# Patient Record
Sex: FEMALE | Race: WHITE | NOT HISPANIC OR LATINO
[De-identification: names, ages, dates, MRNs, and addresses within clinical notes are randomized per-mention and may not be internally consistent; named-entity substitution may affect disease eponyms.]

---

## 2017-03-24 PROBLEM — Z00.00 ENCOUNTER FOR PREVENTIVE HEALTH EXAMINATION: Noted: 2017-03-24

## 2017-04-05 ENCOUNTER — APPOINTMENT (OUTPATIENT)
Dept: ULTRASOUND IMAGING | Facility: CLINIC | Age: 67
End: 2017-04-05

## 2017-05-05 ENCOUNTER — APPOINTMENT (OUTPATIENT)
Dept: GYNECOLOGIC ONCOLOGY | Facility: CLINIC | Age: 67
End: 2017-05-05

## 2017-05-05 VITALS
WEIGHT: 247 LBS | DIASTOLIC BLOOD PRESSURE: 75 MMHG | HEART RATE: 80 BPM | HEIGHT: 68 IN | BODY MASS INDEX: 37.44 KG/M2 | SYSTOLIC BLOOD PRESSURE: 120 MMHG

## 2017-05-05 DIAGNOSIS — F41.0 PANIC DISORDER [EPISODIC PAROXYSMAL ANXIETY]: ICD-10-CM

## 2017-05-05 DIAGNOSIS — E66.01 MORBID (SEVERE) OBESITY DUE TO EXCESS CALORIES: ICD-10-CM

## 2017-05-05 DIAGNOSIS — Z87.898 PERSONAL HISTORY OF OTHER SPECIFIED CONDITIONS: ICD-10-CM

## 2017-05-05 DIAGNOSIS — Z60.2 PROBLEMS RELATED TO LIVING ALONE: ICD-10-CM

## 2017-05-05 DIAGNOSIS — N95.0 POSTMENOPAUSAL BLEEDING: ICD-10-CM

## 2017-05-05 DIAGNOSIS — F17.200 NICOTINE DEPENDENCE, UNSPECIFIED, UNCOMPLICATED: ICD-10-CM
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
## 2017-05-05 DIAGNOSIS — N94.89 OTHER SPECIFIED CONDITIONS ASSOCIATED WITH FEMALE GENITAL ORGANS AND MENSTRUAL CYCLE: ICD-10-CM

## 2017-05-05 DIAGNOSIS — Z86.39 PERSONAL HISTORY OF OTHER ENDOCRINE, NUTRITIONAL AND METABOLIC DISEASE: ICD-10-CM

## 2017-05-05 DIAGNOSIS — Z86.79 PERSONAL HISTORY OF OTHER DISEASES OF THE CIRCULATORY SYSTEM: ICD-10-CM

## 2017-05-05 DIAGNOSIS — I61.9 NONTRAUMATIC INTRACEREBRAL HEMORRHAGE, UNSPECIFIED: ICD-10-CM

## 2017-05-05 DIAGNOSIS — Z87.39 PERSONAL HISTORY OF OTHER DISEASES OF THE MUSCULOSKELETAL SYSTEM AND CONNECTIVE TISSUE: ICD-10-CM

## 2017-05-05 DIAGNOSIS — M54.2 CERVICALGIA: ICD-10-CM

## 2017-05-05 DIAGNOSIS — Z86.69 PERSONAL HISTORY OF OTHER DISEASES OF THE NERVOUS SYSTEM AND SENSE ORGANS: ICD-10-CM

## 2017-05-05 RX ORDER — MULTIVITAMIN
TABLET ORAL
Refills: 0 | Status: ACTIVE | COMMUNITY

## 2017-05-05 RX ORDER — HYDROXYZINE HYDROCHLORIDE 50 MG/1
TABLET ORAL
Refills: 0 | Status: ACTIVE | COMMUNITY

## 2017-05-05 RX ORDER — LOVASTATIN 40 MG/1
TABLET ORAL
Refills: 0 | Status: ACTIVE | COMMUNITY

## 2017-05-05 RX ORDER — MULTIVIT-MIN/IRON/FOLIC ACID/K 18-600-40
CAPSULE ORAL
Refills: 0 | Status: ACTIVE | COMMUNITY

## 2017-05-05 RX ORDER — B-COMPLEX WITH VITAMIN C
TABLET ORAL
Refills: 0 | Status: ACTIVE | COMMUNITY

## 2017-05-05 RX ORDER — LACTOBACILLUS ACIDOPHILUS/PECT 30 MG-20MG
TABLET ORAL
Refills: 0 | Status: ACTIVE | COMMUNITY

## 2017-05-05 RX ORDER — MELATONIN 3 MG
TABLET ORAL
Refills: 0 | Status: ACTIVE | COMMUNITY

## 2017-05-05 RX ORDER — BACILLUS COAGULANS/INULIN 1B-250 MG
CAPSULE ORAL
Refills: 0 | Status: ACTIVE | COMMUNITY

## 2017-05-05 RX ORDER — ARMODAFINIL 200 MG/1
TABLET ORAL
Refills: 0 | Status: ACTIVE | COMMUNITY

## 2017-05-05 SDOH — SOCIAL STABILITY - SOCIAL INSECURITY: PROBLEMS RELATED TO LIVING ALONE: Z60.2

## 2017-05-17 ENCOUNTER — OUTPATIENT (OUTPATIENT)
Dept: OUTPATIENT SERVICES | Facility: HOSPITAL | Age: 67
LOS: 1 days | End: 2017-05-17
Payer: MEDICARE

## 2017-05-17 ENCOUNTER — APPOINTMENT (OUTPATIENT)
Dept: CT IMAGING | Facility: IMAGING CENTER | Age: 67
End: 2017-05-17

## 2017-05-17 DIAGNOSIS — N95.0 POSTMENOPAUSAL BLEEDING: ICD-10-CM

## 2017-05-17 PROCEDURE — 71260 CT THORAX DX C+: CPT

## 2017-05-17 PROCEDURE — 74177 CT ABD & PELVIS W/CONTRAST: CPT

## 2017-05-17 PROCEDURE — 82565 ASSAY OF CREATININE: CPT

## 2017-05-18 ENCOUNTER — RESULT REVIEW (OUTPATIENT)
Age: 67
End: 2017-05-18

## 2017-06-01 LAB — CORE LAB BIOPSY: NORMAL

## 2017-06-02 ENCOUNTER — APPOINTMENT (OUTPATIENT)
Dept: GYNECOLOGIC ONCOLOGY | Facility: CLINIC | Age: 67
End: 2017-06-02

## 2017-06-02 VITALS
WEIGHT: 247 LBS | BODY MASS INDEX: 37.44 KG/M2 | HEART RATE: 91 BPM | SYSTOLIC BLOOD PRESSURE: 157 MMHG | DIASTOLIC BLOOD PRESSURE: 81 MMHG | HEIGHT: 68 IN

## 2018-08-15 ENCOUNTER — APPOINTMENT (OUTPATIENT)
Dept: GERIATRICS | Facility: CLINIC | Age: 68
End: 2018-08-15
Payer: MEDICARE

## 2018-08-15 VITALS
OXYGEN SATURATION: 98 % | TEMPERATURE: 98.2 F | SYSTOLIC BLOOD PRESSURE: 114 MMHG | HEART RATE: 83 BPM | DIASTOLIC BLOOD PRESSURE: 62 MMHG

## 2018-08-15 DIAGNOSIS — Z71.89 OTHER SPECIFIED COUNSELING: ICD-10-CM

## 2018-08-15 PROCEDURE — 99205 OFFICE O/P NEW HI 60 MIN: CPT

## 2018-08-15 PROCEDURE — 99354: CPT

## 2018-08-15 RX ORDER — LOSARTAN POTASSIUM 50 MG/1
50 TABLET, FILM COATED ORAL
Qty: 30 | Refills: 0 | Status: ACTIVE | COMMUNITY
Start: 2018-04-06

## 2018-08-15 RX ORDER — HYDROCHLOROTHIAZIDE 25 MG/1
25 TABLET ORAL
Qty: 30 | Refills: 0 | Status: ACTIVE | COMMUNITY
Start: 2018-03-07

## 2018-08-15 RX ORDER — CLOTRIMAZOLE 10 MG/G
1 CREAM TOPICAL
Qty: 60 | Refills: 0 | Status: ACTIVE | COMMUNITY
Start: 2018-05-24

## 2018-08-15 RX ORDER — HYDROCHLOROTHIAZIDE 25 MG/1
25 TABLET ORAL
Refills: 0 | Status: ACTIVE | COMMUNITY
Start: 2018-08-15

## 2018-08-15 RX ORDER — CLOTRIMAZOLE AND BETAMETHASONE DIPROPIONATE 10; .5 MG/G; MG/G
1-0.05 CREAM TOPICAL
Qty: 15 | Refills: 0 | Status: ACTIVE | COMMUNITY
Start: 2018-05-19

## 2018-08-15 RX ORDER — MUPIROCIN 20 MG/G
2 OINTMENT TOPICAL
Qty: 22 | Refills: 0 | Status: ACTIVE | COMMUNITY
Start: 2018-04-17

## 2018-08-15 RX ORDER — MONTELUKAST 10 MG/1
10 TABLET, FILM COATED ORAL
Qty: 30 | Refills: 0 | Status: ACTIVE | COMMUNITY
Start: 2018-02-21

## 2018-08-15 RX ORDER — GABAPENTIN 300 MG/1
300 CAPSULE ORAL
Qty: 180 | Refills: 0 | Status: ACTIVE | COMMUNITY
Start: 2018-03-07

## 2018-08-15 RX ORDER — DOXEPIN HYDROCHLORIDE 25 MG/1
25 CAPSULE ORAL
Qty: 30 | Refills: 0 | Status: ACTIVE | COMMUNITY
Start: 2018-03-07

## 2018-09-12 ENCOUNTER — APPOINTMENT (OUTPATIENT)
Dept: GERIATRICS | Facility: CLINIC | Age: 68
End: 2018-09-12
Payer: MEDICARE

## 2018-09-12 VITALS
OXYGEN SATURATION: 98 % | SYSTOLIC BLOOD PRESSURE: 120 MMHG | HEART RATE: 82 BPM | DIASTOLIC BLOOD PRESSURE: 56 MMHG | TEMPERATURE: 98.6 F

## 2018-09-12 PROCEDURE — 99215 OFFICE O/P EST HI 40 MIN: CPT

## 2018-10-10 ENCOUNTER — APPOINTMENT (OUTPATIENT)
Dept: GERIATRICS | Facility: CLINIC | Age: 68
End: 2018-10-10
Payer: MEDICARE

## 2018-10-10 VITALS
DIASTOLIC BLOOD PRESSURE: 56 MMHG | HEART RATE: 92 BPM | TEMPERATURE: 98.5 F | OXYGEN SATURATION: 98 % | SYSTOLIC BLOOD PRESSURE: 112 MMHG

## 2018-10-10 PROCEDURE — 99215 OFFICE O/P EST HI 40 MIN: CPT

## 2018-10-18 ENCOUNTER — APPOINTMENT (OUTPATIENT)
Dept: GERIATRICS | Facility: CLINIC | Age: 68
End: 2018-10-18

## 2018-11-13 ENCOUNTER — MESSAGE (OUTPATIENT)
Age: 68
End: 2018-11-13

## 2018-11-29 ENCOUNTER — APPOINTMENT (OUTPATIENT)
Dept: GERIATRICS | Facility: CLINIC | Age: 68
End: 2018-11-29
Payer: MEDICARE

## 2018-11-29 VITALS
DIASTOLIC BLOOD PRESSURE: 70 MMHG | TEMPERATURE: 98.6 F | HEART RATE: 70 BPM | SYSTOLIC BLOOD PRESSURE: 110 MMHG | OXYGEN SATURATION: 98 % | HEIGHT: 68 IN | RESPIRATION RATE: 15 BRPM

## 2018-11-29 DIAGNOSIS — F32.9 MAJOR DEPRESSIVE DISORDER, SINGLE EPISODE, UNSPECIFIED: ICD-10-CM

## 2018-11-29 DIAGNOSIS — Z51.5 ENCOUNTER FOR PALLIATIVE CARE: ICD-10-CM

## 2018-11-29 PROCEDURE — 99215 OFFICE O/P EST HI 40 MIN: CPT

## 2018-12-11 PROBLEM — F32.9 DEPRESSED MOOD: Status: ACTIVE | Noted: 2018-10-10

## 2018-12-11 PROBLEM — Z51.5 ENCOUNTER FOR PALLIATIVE CARE: Status: ACTIVE | Noted: 2018-08-15

## 2019-01-07 ENCOUNTER — APPOINTMENT (OUTPATIENT)
Dept: GERIATRICS | Facility: CLINIC | Age: 69
End: 2019-01-07
Payer: MEDICARE

## 2019-01-07 VITALS
RESPIRATION RATE: 16 BRPM | SYSTOLIC BLOOD PRESSURE: 132 MMHG | DIASTOLIC BLOOD PRESSURE: 68 MMHG | HEART RATE: 62 BPM | TEMPERATURE: 98.2 F | OXYGEN SATURATION: 100 %

## 2019-01-07 DIAGNOSIS — G89.3 NEOPLASM RELATED PAIN (ACUTE) (CHRONIC): ICD-10-CM

## 2019-01-07 DIAGNOSIS — F41.9 ANXIETY DISORDER, UNSPECIFIED: ICD-10-CM

## 2019-01-07 DIAGNOSIS — K59.03 DRUG INDUCED CONSTIPATION: ICD-10-CM

## 2019-01-07 DIAGNOSIS — T40.2X5A DRUG INDUCED CONSTIPATION: ICD-10-CM

## 2019-01-07 DIAGNOSIS — R11.0 NAUSEA: ICD-10-CM

## 2019-01-07 DIAGNOSIS — C53.0 MALIGNANT NEOPLASM OF ENDOCERVIX: ICD-10-CM

## 2019-01-07 PROCEDURE — 99215 OFFICE O/P EST HI 40 MIN: CPT

## 2019-01-07 PROCEDURE — 99497 ADVNCD CARE PLAN 30 MIN: CPT

## 2019-01-07 RX ORDER — METOCLOPRAMIDE 5 MG/1
5 TABLET ORAL 3 TIMES DAILY
Qty: 60 | Refills: 0 | Status: ACTIVE | COMMUNITY
Start: 2019-01-07 | End: 1900-01-01

## 2019-01-07 RX ORDER — MORPHINE SULFATE 15 MG/1
15 TABLET, FILM COATED, EXTENDED RELEASE ORAL
Qty: 60 | Refills: 0 | Status: ACTIVE | COMMUNITY
Start: 2019-01-07 | End: 1900-01-01

## 2019-01-07 RX ORDER — LACTULOSE 10 G/15ML
10 SOLUTION ORAL EVERY 6 HOURS
Qty: 1 | Refills: 2 | Status: ACTIVE | COMMUNITY
Start: 2019-01-07 | End: 1900-01-01

## 2019-01-07 RX ORDER — CLONAZEPAM 2 MG/1
2 TABLET ORAL
Qty: 60 | Refills: 0 | Status: ACTIVE | COMMUNITY
Start: 2018-08-15 | End: 1900-01-01

## 2019-01-08 RX ORDER — CLONAZEPAM 0.25 MG/1
0.25 TABLET, ORALLY DISINTEGRATING ORAL TWICE DAILY
Qty: 30 | Refills: 0 | Status: DISCONTINUED | COMMUNITY
Start: 2017-05-05 | End: 2019-01-08

## 2019-01-08 RX ORDER — CLONAZEPAM 2 MG/1
2 TABLET ORAL
Qty: 90 | Refills: 0 | Status: DISCONTINUED | COMMUNITY
Start: 2018-05-21 | End: 2019-01-08

## 2019-01-08 RX ORDER — METHADONE HYDROCHLORIDE 5 MG/1
5 TABLET ORAL TWICE DAILY
Qty: 30 | Refills: 0 | Status: DISCONTINUED | COMMUNITY
Start: 2018-12-19 | End: 2019-01-08

## 2019-01-08 RX ORDER — BETAMETHASONE DIPROPIONATE 0.5 MG/G
0.05 CREAM TOPICAL
Qty: 45 | Refills: 0 | Status: DISCONTINUED | COMMUNITY
Start: 2018-05-24 | End: 2019-01-08

## 2019-01-15 RX ORDER — HYDROCODONE BITARTRATE AND ACETAMINOPHEN 10; 325 MG/1; MG/1
10-325 TABLET ORAL
Qty: 120 | Refills: 0 | Status: DISCONTINUED | COMMUNITY
Start: 2018-09-12 | End: 2019-01-15

## 2019-01-15 NOTE — DATA REVIEWED
[FreeTextEntry1] : MRI Pelvis (11/5/2018) - Impression: Further interval increase in size of the cervical mass with increased extension into the uterine myometrium. The previously noted parametrial invasion is no longer clearly appreciated.\par \par Interval increase in size of the periuterine metastatic depots versus lymph nodes. Tumor deposit in the rectouterine pouch is now inseparable from the anterior rectal wall which could be seen in the setting of T4 disease. \par \par New right external iliac lymphadenopathy and slightly decreased in size left external iliac adenopathy.
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
## 2019-01-15 NOTE — PHYSICAL EXAM
[General Appearance - Alert] : alert [General Appearance - In No Acute Distress] : in no acute distress [Sclera] : the sclera and conjunctiva were normal [Normal Oral Mucosa] : normal oral mucosa [Neck Appearance] : the appearance of the neck was normal [] : no respiratory distress [Auscultation Breath Sounds / Voice Sounds] : lungs were clear to auscultation bilaterally [Heart Rate And Rhythm] : heart rate was normal and rhythm regular [Heart Sounds] : normal S1 and S2 [Bowel Sounds] : normal bowel sounds [Abdomen Soft] : soft [Abnormal Walk] : normal gait [Skin Color & Pigmentation] : normal skin color and pigmentation [No Focal Deficits] : no focal deficits [Oriented To Time, Place, And Person] : oriented to person, place, and time [Affect] : the affect was normal [FreeTextEntry1] : mild TTP lower abdomen

## 2019-01-15 NOTE — ASSESSMENT
[Completed Healthcare Proxy] : completed healthcare proxy [______] : HCP: [unfilled] [FreeTextEntry1] : 68yoF with:\par \par 1. Endocervical adenocarcinoma - Disease is progressing as per recent MRI.   She wishes to pursue alternative treatments and hopes to control/shrink the tumor this way, although she is realizing this is becoming less likely.  She is not at all interested in pursuing chemotherapy.  \par \par 2. Neoplasm-related pain -  Start MS ER 15mg BID. C/w PRN Hydrocodone/Acetaminophen with diligent bowel regimen.\par C/w Gabapentin 300mg BID\par C/w vaporized medical cannabis PRN\par Cautioned on Ibuprofen and bleeding risk. \par \par 3. Anxiety - C/w PRN Clonazepam. Reassurance provided. \par \par 4. Constipation - C/w daily Lactulose, Senna, flax seed oil.  Glycerin suppository PRN. \par \par 5. Nausea - PRN Reglan prescribed. \par \par 6. Depressed mood - Pt not interested in starting SSRI at this time. She follows with therapist at [facility]. Empathetic listening provided. \par \par 7. Encounter for Palliative Care/Advance Care Planning - Health Care Proxy form completed previously assigning Paige Cuadrami ([phone]) as primary proxy and Candice Kramer ([phone]) as alternate proxy. Extensive discussion previously held about patient's wishes for various life-sustaining measures and medical interventions.  Patient expresses that she would never be agreeable to getting a colostomy or nephrostomy should her tumor cause obstructions. If it came to that point she would want to be given medications to keep her free from pain and would accept death as a consequence. \par \par MOLST form completed in office today delineating patient's preferences for DNR, DNI, no feeding tube, no hospitalizations, no IV fluids. \par \par Emotional support provided.\par \par RTO 1 month, sooner if needed

## 2019-01-18 ENCOUNTER — INPATIENT (INPATIENT)
Facility: HOSPITAL | Age: 69
LOS: 12 days | Discharge: ANOTHER HOSPITAL | End: 2019-01-31
Attending: HOSPITALIST | Admitting: HOSPITALIST
Payer: MEDICARE

## 2019-01-18 VITALS
HEART RATE: 975 BPM | OXYGEN SATURATION: 100 % | SYSTOLIC BLOOD PRESSURE: 136 MMHG | RESPIRATION RATE: 18 BRPM | TEMPERATURE: 98 F | DIASTOLIC BLOOD PRESSURE: 75 MMHG

## 2019-01-18 DIAGNOSIS — Z51.5 ENCOUNTER FOR PALLIATIVE CARE: ICD-10-CM

## 2019-01-18 DIAGNOSIS — G89.3 NEOPLASM RELATED PAIN (ACUTE) (CHRONIC): ICD-10-CM

## 2019-01-18 DIAGNOSIS — C53.0 MALIGNANT NEOPLASM OF ENDOCERVIX: ICD-10-CM

## 2019-01-18 DIAGNOSIS — G89.29 OTHER CHRONIC PAIN: ICD-10-CM

## 2019-01-18 DIAGNOSIS — F41.9 ANXIETY DISORDER, UNSPECIFIED: ICD-10-CM

## 2019-01-18 DIAGNOSIS — Z71.89 OTHER SPECIFIED COUNSELING: ICD-10-CM

## 2019-01-18 LAB
ALBUMIN SERPL ELPH-MCNC: 4 G/DL — SIGNIFICANT CHANGE UP (ref 3.3–5)
ALP SERPL-CCNC: 64 U/L — SIGNIFICANT CHANGE UP (ref 40–120)
ALT FLD-CCNC: 10 U/L — SIGNIFICANT CHANGE UP (ref 4–33)
ANION GAP SERPL CALC-SCNC: 14 MMO/L — SIGNIFICANT CHANGE UP (ref 7–14)
AST SERPL-CCNC: 13 U/L — SIGNIFICANT CHANGE UP (ref 4–32)
BASOPHILS # BLD AUTO: 0.04 K/UL — SIGNIFICANT CHANGE UP (ref 0–0.2)
BASOPHILS NFR BLD AUTO: 0.5 % — SIGNIFICANT CHANGE UP (ref 0–2)
BILIRUB SERPL-MCNC: 0.3 MG/DL — SIGNIFICANT CHANGE UP (ref 0.2–1.2)
BUN SERPL-MCNC: 6 MG/DL — LOW (ref 7–23)
CALCIUM SERPL-MCNC: 9.7 MG/DL — SIGNIFICANT CHANGE UP (ref 8.4–10.5)
CHLORIDE SERPL-SCNC: 95 MMOL/L — LOW (ref 98–107)
CO2 SERPL-SCNC: 26 MMOL/L — SIGNIFICANT CHANGE UP (ref 22–31)
CREAT SERPL-MCNC: 0.62 MG/DL — SIGNIFICANT CHANGE UP (ref 0.5–1.3)
EOSINOPHIL # BLD AUTO: 0.05 K/UL — SIGNIFICANT CHANGE UP (ref 0–0.5)
EOSINOPHIL NFR BLD AUTO: 0.6 % — SIGNIFICANT CHANGE UP (ref 0–6)
GLUCOSE SERPL-MCNC: 107 MG/DL — HIGH (ref 70–99)
HCT VFR BLD CALC: 32.9 % — LOW (ref 34.5–45)
HGB BLD-MCNC: 9.9 G/DL — LOW (ref 11.5–15.5)
IMM GRANULOCYTES NFR BLD AUTO: 0.3 % — SIGNIFICANT CHANGE UP (ref 0–1.5)
LYMPHOCYTES # BLD AUTO: 1.33 K/UL — SIGNIFICANT CHANGE UP (ref 1–3.3)
LYMPHOCYTES # BLD AUTO: 15.1 % — SIGNIFICANT CHANGE UP (ref 13–44)
MCHC RBC-ENTMCNC: 25 PG — LOW (ref 27–34)
MCHC RBC-ENTMCNC: 30.1 % — LOW (ref 32–36)
MCV RBC AUTO: 83.1 FL — SIGNIFICANT CHANGE UP (ref 80–100)
MONOCYTES # BLD AUTO: 0.39 K/UL — SIGNIFICANT CHANGE UP (ref 0–0.9)
MONOCYTES NFR BLD AUTO: 4.4 % — SIGNIFICANT CHANGE UP (ref 2–14)
NEUTROPHILS # BLD AUTO: 6.97 K/UL — SIGNIFICANT CHANGE UP (ref 1.8–7.4)
NEUTROPHILS NFR BLD AUTO: 79.1 % — HIGH (ref 43–77)
NRBC # FLD: 0 K/UL — LOW (ref 25–125)
PLATELET # BLD AUTO: 403 K/UL — HIGH (ref 150–400)
PMV BLD: 8.5 FL — SIGNIFICANT CHANGE UP (ref 7–13)
POTASSIUM SERPL-MCNC: 3.8 MMOL/L — SIGNIFICANT CHANGE UP (ref 3.5–5.3)
POTASSIUM SERPL-SCNC: 3.8 MMOL/L — SIGNIFICANT CHANGE UP (ref 3.5–5.3)
PROT SERPL-MCNC: 7.1 G/DL — SIGNIFICANT CHANGE UP (ref 6–8.3)
RBC # BLD: 3.96 M/UL — SIGNIFICANT CHANGE UP (ref 3.8–5.2)
RBC # FLD: 14.7 % — HIGH (ref 10.3–14.5)
SODIUM SERPL-SCNC: 135 MMOL/L — SIGNIFICANT CHANGE UP (ref 135–145)
WBC # BLD: 8.81 K/UL — SIGNIFICANT CHANGE UP (ref 3.8–10.5)
WBC # FLD AUTO: 8.81 K/UL — SIGNIFICANT CHANGE UP (ref 3.8–10.5)

## 2019-01-18 PROCEDURE — 99223 1ST HOSP IP/OBS HIGH 75: CPT

## 2019-01-18 PROCEDURE — 99223 1ST HOSP IP/OBS HIGH 75: CPT | Mod: GC

## 2019-01-18 RX ORDER — HYDROMORPHONE HYDROCHLORIDE 2 MG/ML
1 INJECTION INTRAMUSCULAR; INTRAVENOUS; SUBCUTANEOUS
Qty: 0 | Refills: 0 | COMMUNITY

## 2019-01-18 RX ORDER — MORPHINE SULFATE 50 MG/1
30 CAPSULE, EXTENDED RELEASE ORAL
Qty: 0 | Refills: 0 | Status: DISCONTINUED | OUTPATIENT
Start: 2019-01-18 | End: 2019-01-19

## 2019-01-18 RX ORDER — HYDROMORPHONE HYDROCHLORIDE 2 MG/ML
1 INJECTION INTRAMUSCULAR; INTRAVENOUS; SUBCUTANEOUS EVERY 4 HOURS
Qty: 0 | Refills: 0 | Status: DISCONTINUED | OUTPATIENT
Start: 2019-01-18 | End: 2019-01-19

## 2019-01-18 RX ORDER — GABAPENTIN 400 MG/1
300 CAPSULE ORAL
Qty: 0 | Refills: 0 | Status: DISCONTINUED | OUTPATIENT
Start: 2019-01-18 | End: 2019-01-22

## 2019-01-18 RX ORDER — CLONAZEPAM 1 MG
1 TABLET ORAL
Qty: 0 | Refills: 0 | COMMUNITY

## 2019-01-18 RX ORDER — SENNA PLUS 8.6 MG/1
2 TABLET ORAL AT BEDTIME
Qty: 0 | Refills: 0 | Status: DISCONTINUED | OUTPATIENT
Start: 2019-01-18 | End: 2019-01-22

## 2019-01-18 RX ORDER — SENNA PLUS 8.6 MG/1
2 TABLET ORAL
Qty: 0 | Refills: 0 | COMMUNITY

## 2019-01-18 RX ORDER — BENZOYL PEROXIDE MICRONIZED 5.8 %
1 TOWELETTE (EA) TOPICAL
Qty: 0 | Refills: 0 | COMMUNITY

## 2019-01-18 RX ORDER — CLONAZEPAM 1 MG
2 TABLET ORAL
Qty: 0 | Refills: 0 | Status: DISCONTINUED | OUTPATIENT
Start: 2019-01-18 | End: 2019-01-25

## 2019-01-18 RX ADMIN — MORPHINE SULFATE 30 MILLIGRAM(S): 50 CAPSULE, EXTENDED RELEASE ORAL at 17:49

## 2019-01-18 RX ADMIN — HYDROMORPHONE HYDROCHLORIDE 1 MILLIGRAM(S): 2 INJECTION INTRAMUSCULAR; INTRAVENOUS; SUBCUTANEOUS at 23:10

## 2019-01-18 RX ADMIN — HYDROMORPHONE HYDROCHLORIDE 1 MILLIGRAM(S): 2 INJECTION INTRAMUSCULAR; INTRAVENOUS; SUBCUTANEOUS at 23:54

## 2019-01-18 RX ADMIN — HYDROMORPHONE HYDROCHLORIDE 1 MILLIGRAM(S): 2 INJECTION INTRAMUSCULAR; INTRAVENOUS; SUBCUTANEOUS at 20:05

## 2019-01-18 RX ADMIN — Medication 2 MILLIGRAM(S): at 23:11

## 2019-01-18 RX ADMIN — HYDROMORPHONE HYDROCHLORIDE 1 MILLIGRAM(S): 2 INJECTION INTRAMUSCULAR; INTRAVENOUS; SUBCUTANEOUS at 17:49

## 2019-01-18 RX ADMIN — MORPHINE SULFATE 30 MILLIGRAM(S): 50 CAPSULE, EXTENDED RELEASE ORAL at 20:05

## 2019-01-18 NOTE — CONSULT NOTE ADULT - PROBLEM SELECTOR RECOMMENDATION 9
Patient previously on Methadone without success.  Currently on MS Contin 30mg PO BID (increased on Tuesday) and Dilaudid 4mg PO q4h PRN - Patient also taking Hydrocodone 10-20mg (unclear how many times a day) and Motrin PRN.  Patient reports pain has been better since being in the hospital - and that she has not taking any PRN medications since early this morning.  She states she had stopped the MS Contin due to a misunderstanding - but took a dose this morning - unclear as patient became frustrated with questioning what medications and amounts she was taking at home.  As per Dr. Levine - patient has not been an opioid seeker and has never requested to be hospitalized.  It seems there is a large psychosocial component given her progression of disease and pain.  Due to the unclear nature of what the patient was consistently taking at home, would recommend continuing MS Contin 30mg PO BID (assuming her renal function is normal) and starting Dilaudid 1mg IV q3h PRN - increase dose as needed.  Patient utilizing lactulose and dulcolax PO at home with herbal medicines - difficult to ascertain the dosing and frequency due to patient's discomfort/frustration.  Please restart patient on home bowel regimen.  Please page [phone] with any questions or concerns. Patient previously on Methadone without success.  Currently on MS Contin 30mg PO BID (increased on Tuesday) and Dilaudid 4mg PO q4h PRN - Patient also taking Hydrocodone 10-20mg (unclear how many times a day) Neurontin 1200mg PO QHS and Motrin PRN.  Patient reports pain has been better since being in the hospital - and that she has not taking any PRN medications since early this morning.  She states she had stopped the MS Contin due to a misunderstanding - but took a dose this morning - unclear as patient became frustrated with questioning what medications and amounts she was taking at home.  As per Dr. Levine - patient has not been an opioid seeker and has never requested to be hospitalized.  It seems there is a large psychosocial component given her progression of disease and pain.  Due to the unclear nature of what the patient was consistently taking at home, would recommend continuing MS Contin 30mg PO BID (assuming her renal function is normal) and starting Dilaudid 1mg IV q3h PRN - increase dose as needed.  Continue patient's home neurontin dose (Assuming renal function is normal) Patient utilizing lactulose and dulcolax PO at home with herbal medicines - difficult to ascertain the dosing and frequency due to patient's discomfort/frustration.  Please restart patient on home bowel regimen.  Please page [phone] with any questions or concerns. Patient previously on Methadone without success.  Currently on MS Contin 30mg PO BID (increased on Tuesday) and Dilaudid 4mg PO q4h PRN - Patient also taking Hydrocodone 10-20mg (unclear how many times a day) Neurontin 1200mg PO QHS and Motrin PRN.  Patient reports pain has been better since being in the hospital - and that she has not taking any PRN medications since early this morning.  She states she had stopped the MS Contin due to a misunderstanding - but took a dose this morning - unclear as patient became frustrated with questioning what medications and amounts she was taking at home.  As per Dr. Levine - patient has not been an opioid seeker and has never requested to be hospitalized.  It seems there is a large psychosocial component given her progression of disease and pain.  Due to the unclear nature of what the patient was consistently taking at home, would recommend continuing MS Contin 30mg PO BID (assuming her renal function is normal) and starting Dilaudid 1mg IV q3h PRN - increase dose as needed.  Continue patient's home neurontin dose (Assuming renal function is normal).  Of note, patient with 2 lidoderm patches on her lower abdomen - she is unsure if they are effective - can continue if patient feels they are helpful. Patient utilizing lactulose and dulcolax PO at home with herbal medicines - difficult to ascertain the dosing and frequency due to patient's discomfort/frustration.  Please restart patient on home bowel regimen.  Please page [phone] with any questions or concerns.

## 2019-01-18 NOTE — H&P ADULT - PMH
c spine herniations    Chronic Pain    Depression  treated since  1992  Disturbance of Memory  since 10/2010 after subdural hematomas, issue with short term memory recall  Endocervical adenocarcinoma    Hyperlipidemia  no meds  Lumbar Disc herniations   l4 l5  since 1981  Mitral Valve Regurgitation  echo 2008  OA (Osteoarthritis)  hip  Subdural Haemorrhage, Nontraumatic  9/2010 after being started on coumadin, patient denied surgical intervention resolved on own.

## 2019-01-18 NOTE — CONSULT NOTE ADULT - PROBLEM SELECTOR RECOMMENDATION 3
Patient with chronic anxiety.  She takes Klonopin 2mg PO q12h standing at home.  Please continue home regimen.  Psychosocial support provided. Patient with chronic anxiety.  She takes Klonopin 2mg PO q12h standing at home.   Please continue home regimen.  Psychosocial support provided.

## 2019-01-18 NOTE — ED PROVIDER NOTE - MEDICAL DECISION MAKING DETAILS
67yo F w/ hx endocervical cancer met p/w pain control. Palliative consulted and recs 1mg dilaudid IV prn q4h and c/w home regimen ms contin 30mg BID.

## 2019-01-18 NOTE — ED ADULT NURSE NOTE - NSIMPLEMENTINTERV_GEN_ALL_ED
Implemented All Universal Safety Interventions:  [place] to call system. Call bell, personal items and telephone within reach. Instruct patient to call for assistance. Room bathroom lighting operational. Non-slip footwear when patient is off stretcher. Physically safe environment: no spills, clutter or unnecessary equipment. Stretcher in lowest position, wheels locked, appropriate side rails in place.

## 2019-01-18 NOTE — CONSULT NOTE ADULT - PROBLEM SELECTOR RECOMMENDATION 2
Patient with progression of disease as of 11/2018 - she chose to pursue alternative measures at that time.  Can readdress goals of care when patient more comfortable.

## 2019-01-18 NOTE — ED PROVIDER NOTE - PHYSICAL EXAMINATION
T(C): 36.7 (01-18-19 @ 15:12), Max: 36.7 (01-18-19 @ 15:12)  HR: 98 (01-18-19 @ 15:58) (97 - 975)  BP: 131/68 (01-18-19 @ 15:58) (131/68 - 136/75)  RR: 19 (01-18-19 @ 15:58) (18 - 19)  SpO2: 100% (01-18-19 @ 15:58) (100% - 100%)  Wt(kg): --  GENERAL: NAD, well-developed  HEAD:  Atraumatic, Normocephalic  EYES: EOMI, PERRLA, conjunctiva and sclera clear  NECK: Supple, No JVD  CHEST/LUNG: Clear to auscultation bilaterally; No wheeze  HEART: Regular rate and rhythm; No murmurs, rubs, or gallops  ABDOMEN: Soft, Nontender, Nondistended; Bowel sounds present  EXTREMITIES:  2+ Peripheral Pulses, No clubbing, cyanosis, or edema  PSYCH: AAOx3  NEUROLOGY: non-focal  SKIN: No rashes or lesions

## 2019-01-18 NOTE — ED PROVIDER NOTE - PMH
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

## 2019-01-18 NOTE — H&P ADULT - PROBLEM SELECTOR PLAN 4
- GOC discussed outpatient with Dr Abe Thompson and inpatient with Palliative Care Team  - ELAN in chart - DNR/DNI, no IVF, determine use/limitation for ab if infection occurs, no feeding tube, do not send to hospital unless pain or severe symptoms cannot be otherwise controlled, comfort measures only  - will need to readdress if patient wants to be re-evaluated for possibility of radiation

## 2019-01-18 NOTE — H&P ADULT - NSHPLABSRESULTS_GEN_ALL_CORE
9.9    8.81  )-----------( 403      ( 18 Jan 2019 17:53 )             32.9     01-18    135  |  95<L>  |  6<L>  ----------------------------<  107<H>  3.8   |  26  |  0.62    Ca    9.7      18 Jan 2019 17:53    TPro  7.1  /  Alb  4.0  /  TBili  0.3  /  DBili  x   /  AST  13  /  ALT  10  /  AlkPhos  64  01-18

## 2019-01-18 NOTE — H&P ADULT - NSHPPHYSICALEXAM_GEN_ALL_CORE
Vital Signs Last 24 Hrs  T(C): 36.8 (18 Jan 2019 17:53), Max: 36.8 (18 Jan 2019 17:53)  T(F): 98.2 (18 Jan 2019 17:53), Max: 98.2 (18 Jan 2019 17:53)  HR: 82 (18 Jan 2019 17:53) (82 - 975)  BP: 133/68 (18 Jan 2019 17:53) (131/68 - 136/75)  BP(mean): --  RR: 18 (18 Jan 2019 17:53) (18 - 19)  SpO2: 100% (18 Jan 2019 17:53) (100% - 100%) Vital Signs Last 24 Hrs  T(C): 36.8 (18 Jan 2019 17:53), Max: 36.8 (18 Jan 2019 17:53)  T(F): 98.2 (18 Jan 2019 17:53), Max: 98.2 (18 Jan 2019 17:53)  HR: 82 (18 Jan 2019 17:53) (82 - 975)  BP: 133/68 (18 Jan 2019 17:53) (131/68 - 136/75)  BP(mean): --  RR: 18 (18 Jan 2019 17:53) (18 - 19)  SpO2: 100% (18 Jan 2019 17:53) (100% - 100%)    GENERAL: NAD, well-developed  HEAD:  Atraumatic, Normocephalic  EYES: EOMI, PERRLA, conjunctiva and sclera clear  NECK: Supple, No JVD  CHEST/LUNG: Clear to auscultation bilaterally; No wheeze  HEART: Regular rate and rhythm; No murmurs, rubs, or gallops  ABDOMEN: Soft, Nontender, Nondistended; Bowel sounds present  EXTREMITIES:  2+ Peripheral Pulses, No clubbing, cyanosis, or edema  PSYCH: AAOx3  NEUROLOGY: non-focal  SKIN: No rashes or lesions Vital Signs Last 24 Hrs  T(C): 36.8 (18 Jan 2019 17:53), Max: 36.8 (18 Jan 2019 17:53)  T(F): 98.2 (18 Jan 2019 17:53), Max: 98.2 (18 Jan 2019 17:53)  HR: 82 (18 Jan 2019 17:53) (82 - 975)  BP: 133/68 (18 Jan 2019 17:53) (131/68 - 136/75)  BP(mean): --  RR: 18 (18 Jan 2019 17:53) (18 - 19)  SpO2: 100% (18 Jan 2019 17:53) (100% - 100%)    GENERAL: NAD, well-developed  HEAD:  Atraumatic, Normocephalic  EYES: EOMI, PERRLA, conjunctiva and sclera clear  NECK: Supple, No JVD  CHEST/LUNG: Clear to auscultation bilaterally; No wheeze  HEART: Regular rate and rhythm; No murmurs, rubs, or gallops  ABDOMEN: Soft, tender to palpation in lower abdomen, Nondistended; Bowel sounds present  EXTREMITIES:  2+ Peripheral Pulses, No clubbing, cyanosis, or edema  PSYCH: AAOx3  NEUROLOGY: non-focal  SKIN: No rashes or lesions Vital Signs Last 24 Hrs  T(C): 36.8 (18 Jan 2019 17:53), Max: 36.8 (18 Jan 2019 17:53)  T(F): 98.2 (18 Jan 2019 17:53), Max: 98.2 (18 Jan 2019 17:53)  HR: 82 (18 Jan 2019 17:53) (82 - 975)  BP: 133/68 (18 Jan 2019 17:53) (131/68 - 136/75)  BP(mean): --  RR: 18 (18 Jan 2019 17:53) (18 - 19)  SpO2: 100% (18 Jan 2019 17:53) (100% - 100%)    T(C): 36.3 (01-18-19 @ 22:42), Max: 37 (01-18-19 @ 21:41)  HR: 94 (01-19-19 @ 03:55) (70 - 975)  BP: 143/74 (01-18-19 @ 22:42) (117/54 - 143/74)  RR: 18 (01-18-19 @ 22:42) (18 - 19)  SpO2: 100% (01-19-19 @ 03:55) (97% - 100%)    Constitutional: NAD, well-developed, well-nourished  Ears, Nose, Mouth, and Throat: normal external ears and nose, normal hearing, moist oral mucosa  Eyes: normal conjunctiva, EOMI, PERRL  Neck: supple, no JVD  Respiratory: Clear to auscultation bilaterally. No wheezes, rales or rhonchi. Normal respiratory effort  Cardiovascular: RRR, no M/R/G, no edema, 2+ Peripheral Pulses  Gastrointestinal: Soft, tender to palpation in lower abdomen, Nondistended; Bowel sounds present  Skin: warm, dry, no rash  Neurologic: sensation grossly intact, CN grossly intact, non-focal exam  Musculoskeletal: no clubbing, no cyanosis, no joint swelling  Psychiatric: AOX3, +anxiety

## 2019-01-18 NOTE — PATIENT PROFILE ADULT - INDICATE TYPE
Health Care Proxy (HCP)/Do Not Resuscitate (DNR) Medical Orders for Life-Sustaining Treatment (MOLST)/Do Not Resuscitate (DNR)

## 2019-01-18 NOTE — ED PROVIDER NOTE - OBJECTIVE STATEMENT
67yo F w/ 69yo F w/ hx of advanced met endocervical cancer p/w cc of pain. Patient was initially diagnosed in 2017. Deemed non-operable and never pursued chemo or RT. Patient disease became advanced in Nov 2018 as demonstrated by MRI. Patient followed outpatient with palliative care for pain control. Recently increased from 15mg BID MS contin to 30mg BID along with prn dilaudid q3-4h. Patient reported suffering from tremendous uncontrolled pain in the past few days and unable to tolerate po. Previous plan was for hospice arrangement and MOLST form recorded no IVF and no hospitalization. Otherwise, no resp, cardiac, GI, urinary sx.

## 2019-01-18 NOTE — ED PROVIDER NOTE - ATTENDING CONTRIBUTION TO CARE
DR. JONES, ATTENDING MD-  I performed a face to face bedside interview with patient regarding history of present illness, review of symptoms and past medical history. I completed an independent physical exam.  I have discussed patient's plan of care with the resident.   Documentation as above in the note.    67 y/o female h/o inoperable gyn ca here with worsening of her chronic lower abd pain.  States she has been taking home pain meds without relief.  Called her palliative md who advised she go to ED for admission for pain control.  Denies f/c, ha, neck stiffness, cp, sob, cough, n/v/d, dysuria, rash.  Afebrile, vs wnl, uncomfortable, ctabil, s1s2 rrr no m/r/g, abd soft non ttp no r/g, no cva tenderness b/l, no leg swelling b/l, no rash.  CA pain, no signs of obstruction, will tx pain, consult palliative, likely admission.

## 2019-01-18 NOTE — H&P ADULT - HISTORY OF PRESENT ILLNESS
68F with endocervical cancer stage IB2 on diagnosis in 5/2017 presents from home for uncontrolled cancer pain. Patient follows with Dr Abe Thompson outpatient and was recently increased to MS ER 30mg BID and transitioned from hydrocodone to hydromorphone 2mg q3-4 prn on 1/15/19.     History wise, patient was told at time of diagnosis the malignancy was unresectable based on site. RT and chemo were recommended however patient followed up with an alternative/holistic doctor in [place] and decided to try alternative therapies including vit C infusions, glutathione, vegan diet, baking soda uterine washes, ozone therapy. Patient had an MRI done 11/5/18 which confirmed progression of disease. 68F with endocervical cancer stage IB2 on diagnosis in 5/2017 presents from home for uncontrolled cancer pain. Patient follows with Dr Abe Thompson outpatient and was recently increased to MS ER 30mg BID and transitioned from hydrocodone to hydromorphone 2mg q3-4 prn on 1/15/19. She states more recently the pain has been constant, sharp during the day 9.5/10 in her lower abdomen. She previously would only get pain at night. She also reports some nausea, no vomiting. She was on PO medications for pain control outpatient including morphine ER, hydrocodone/acetaminophen, gabapentin taken intermittently, THC, has failed methadone previously and was taking ibuprofen as well. Recently had morphine dose increased and hydrocodone switched to hydromorphone on 1/15 without improvement.    History wise, patient followed with gyn/onc in June 2017 who recommended concurrent chemo/RT as opposed to radical hysterectomy. Patient did not want chemotherapy however agreed to radiation and rad/onc referral was made. Patient did not follow up however had all records transferred to [facility] [place] opinion. Patient then decided to pursue alternative treatments with a holistic doctor including vit C infusions, glutathione, vegan diet, baking soda uterine washes, ozone therapy. Patient had an MRI done 11/5/18 which confirmed progression of disease. Patient follows with Dr Abe Thompson for pain management. 68F with endocervical cancer stage IB2 on diagnosis in 5/2017 presents from home for uncontrolled cancer pain. Patient follows with Dr Abe Thompson outpatient and was recently increased to MS ER 30mg BID and transitioned from hydrocodone to hydromorphone 2mg q3-4 prn on 1/15/19. She states more recently the pain has been constant, sharp during the day 9.5/10 in her lower abdomen. She previously would only get pain at night. She also reports some nausea, no vomiting. She was on PO medications for pain control outpatient including morphine ER, hydrocodone/acetaminophen, gabapentin taken intermittently, THC, has failed methadone previously and was taking ibuprofen as well. Recently had morphine dose increased and hydrocodone switched to hydromorphone on 1/15 without improvement.    History wise, patient followed with gyn/onc in June 2017 who recommended concurrent chemo/RT as opposed to radical hysterectomy. Patient did not want chemotherapy however agreed to radiation and rad/onc referral was made. Patient did not follow up however had all records transferred to [facility] [place] opinion. Patient then decided to pursue alternative treatments with a holistic doctor including vit C infusions, glutathione, vegan diet, baking soda uterine washes, ozone therapy. Patient had an MRI done 11/5/18 which confirmed progression of disease. Patient follows with Dr Abe Thompson for pain management. 68F with endocervical cancer stage IB2 on diagnosis in 5/2017 presents from home for uncontrolled cancer pain. Patient follows with Dr Abe Thompson outpatient and was recently increased to MS ER 30mg BID and transitioned from hydrocodone to hydromorphone 2mg q3-4 prn on 1/15/19. She states more recently the pain has been constant, sharp during the day 9.5/10 in her lower abdomen. She previously would only get pain at night. She also reports some nausea, no vomiting. She was on PO medications for pain control outpatient including morphine ER, hydrocodone/acetaminophen, gabapentin taken intermittently, THC, has failed methadone previously and was taking ibuprofen as well. Recently had morphine dose increased and hydrocodone switched to hydromorphone on 1/15 without improvement.    History wise, patient followed with gyn/onc in June 2017 who recommended concurrent chemo/RT as opposed to radical hysterectomy. Patient did not want chemotherapy however agreed to radiation and rad/onc referral was made. Patient did not follow up however had all records transferred to [facility] [place] opinion. Patient then decided to pursue alternative treatments with a holistic doctor including vit C infusions, glutathione, vegan diet, baking soda uterine washes, ozone therapy. Patient had an MRI done 11/5/18 which confirmed progression of disease. Patient follows with Dr Abe Thompson for pain management.      No family history pertinent to patient’s current condition/encounter

## 2019-01-18 NOTE — ED ADULT TRIAGE NOTE - CHIEF COMPLAINT QUOTE
Pt with endocervical cancer , co pain to pelvic and lower back area. Pt under care of pain management doctor and palliative care.

## 2019-01-18 NOTE — H&P ADULT - PROBLEM SELECTOR PLAN 2
- MRI in 11/2018 confirming progression of disease  - patient not interested in chemotherapy, only wants to pursue alternative measures  - hospice referral made 12/19/18 however patient has not yet had a consultation - MRI in 11/2018 confirming progression of disease  - patient not interested in chemotherapy, has been only pursuing alternative measures however currently requesting rad/onc eval - per patient and friend at bedside she recently saw a radiation oncologist outside of [facility] who recommended radiation  -   - hospice referral made 12/19/18 however patient has not yet had a consultation - MRI in 11/2018 confirming progression of disease  - patient not interested in chemotherapy, has been only pursuing alternative measures however currently requesting rad/onc eval - per patient and friend at bedside she recently saw a radiation oncologist outside of [facility] who recommended radiation  - will discuss in AM with patient separately as it seems during interview her wishes may not align with friend at bedside  - hospice referral made 12/19/18 however patient has not yet had a consultation

## 2019-01-18 NOTE — H&P ADULT - ASSESSMENT
68F with endocervical cancer stage IB2 on diagnosis in 5/2017 presents from home for uncontrolled cancer pain.

## 2019-01-18 NOTE — ED PROVIDER NOTE - PLAN OF CARE
69yo F w/ hx of met endocervical cancer p/w cc of pain. Palliative consulted for pain management at this time. Will f/u recommendation.

## 2019-01-18 NOTE — ED PROVIDER NOTE - NS ED ROS FT
CONSTITUTIONAL: Generalized pain  EYES/ENT: No visual changes;  No vertigo or throat pain   NECK: No pain or stiffness  RESPIRATORY: No cough, wheezing, hemoptysis; No shortness of breath  CARDIOVASCULAR: No chest pain or palpitations  GASTROINTESTINAL: Constipation, abd pain, nausea  GENITOURINARY: No dysuria, frequency or hematuria  NEUROLOGICAL: No numbness or weakness  SKIN: No itching, burning, rashes, or lesions   All other review of systems is negative unless indicated above.

## 2019-01-18 NOTE — ED PROVIDER NOTE - PSH
Plastic Surgery  to face due to facial bite  S/P Hip Replacement  Right total hip replacement  9/2010  S/P Tonsillectomy  age 7

## 2019-01-18 NOTE — ED ADULT NURSE NOTE - OBJECTIVE STATEMENT
Received pt. in room 21 alert and oriented x 4 complaining of lower abdominal and lower back pain. Pt. have a history of Endocervical cancer and chronic pain and stated " I always have this pain but it has become worse over the past week". Pt. is ambulatory, no nausea or vomiting, medicated as ordered will continue to monitor.

## 2019-01-18 NOTE — H&P ADULT - PROBLEM SELECTOR PLAN 3
- prescribed clonazepam 2mg BID - per patient takes it when needed however has been taking it more at night to help with sleep  - will c/w 2mg BID prn while admitted  - last script sent 1/7 and filled 1/17  Istop reference #[id number]

## 2019-01-18 NOTE — H&P ADULT - NSHPREVIEWOFSYSTEMS_GEN_ALL_CORE
CONSTITUTIONAL:  No weight loss, fever, chills  HEENT:  Eyes:  No vision changes  Ears, Nose, Throat:  No sneezing, congestion, runny nose or dysphagia.  SKIN:  No rash or itching.  CARDIOVASCULAR:  No chest pain, chest pressure or chest discomfort. No palpitations or edema.  RESPIRATORY:  No shortness of breath, cough or sputum.  GASTROINTESTINAL:  see HPI  GENITOURINARY:  No hematuria, dysuria.   NEUROLOGICAL:  No headache, dizziness, syncope, paralysis, ataxia, numbness or tingling in the extremities. No change in bowel or bladder control.  MUSCULOSKELETAL:  No muscle, back pain, joint pain or stiffness.  HEMATOLOGIC:  No anemia, bleeding or bruising.  LYMPHATICS:  No enlarged nodes. No history of splenectomy.  PSYCHIATRIC:  No history of depression or anxiety.  ENDOCRINOLOGIC:  No reports of sweating, cold or heat intolerance. No polyuria or polydipsia.  ALLERGIES:  No history of asthma, hives, eczema or rhinitis. Constitutional Symptoms: No weakness, fevers, chills, weight loss  Eyes: No visual changes, headache, eye pain, double vision  Ears, Nose, Mouth, Throat: No runny nose, sinus pain, ear pain, tinnitus, sore throat, dysphagia, odynophagia  Cardiovascular: No chest pain, palpitations, edema  Respiratory: No cough, wheezing, hemoptysis, shortness of breath  Gastrointestinal: +abdominal pain, nausea, no vomiting  Genitourinary: No dysuria, frequency, hematuria  Musculoskeletal: No joint pain, joint swelling, decreased ROM  Skin: No pruritus, rashes, lesions, wounds  Neurologic:  No seizures, headache, paraesthesia, numbness, limb weakness    Positives and pertinent negatives noted and all other systems negative.

## 2019-01-18 NOTE — CONSULT NOTE ADULT - SUBJECTIVE AND OBJECTIVE BOX
HPI: 68 year old female with Endocervical cancer Stage IB2 on diagnosis (5/2017) - As per outpatient provider documentation (Dr. Levine) - patient's states that at the time of diagnosis she was informed that her disease was unresectable based on the site of the tumor - she sought medical opinions at both [facility] and [facility].  RT and chemo were recommended - patient chose to pursue alternative therapies.  Patient presents to ED at the request of Dr. Levine due to uncontrolled pain as an outpatient.      Patient currently sitting up in a stretcher and eating saltines in no acute distress.     PERTINENT PM/SXH:   Subdural Haemorrhage, Nontraumatic  Disturbance of Memory  OA (Osteoarthritis)  c spine herniations  Lumbar Disc herniations   l4 l5  Depression  Mitral Valve Regurgitation  Hyperlipidemia  Chronic Pain    S/P Hip Replacement  Plastic Surgery  S/P Tonsillectomy    FAMILY HISTORY:      SOCIAL HISTORY:   Significant other/partner: Yes [ ]  No [x ] Children:  Yes [ ]  No [x ] Jew/Spirituality:  Substance hx: Medical Marijuana  Tobacco hx:  Yes [ ] No [x ]   Alcohol hx: Denies   Home Opioid hx:  Yes [x ]   Living Situation: [x ]Home  [ ]Long term care  [ ]Rehab [ ]Other    ADVANCE DIRECTIVES:      [x ] Health Care Proxy(s)  [ ] Surrogate(s)  [ ] Guardian           Name(s): Phone Number(s): Dr. Paige Vergara [phone] - primary Candice Williams [phone]    BASELINE (I)ADL(s) (prior to admission):  [place]: [x ]Total  [ ] Moderate [ ]Dependent    Allergies    No Known Allergies    Intolerances    MEDICATIONS  (STANDING):  morphine ER Tablet 30 milliGRAM(s) Oral two times a day    MEDICATIONS  (PRN):  HYDROmorphone  Injectable 1 milliGRAM(s) IV Push every 4 hours PRN Severe Pain (7 - 10)    PRESENT SYMPTOMS: [ ]Unable to obtain due to poor mentation   Source if other than patient:  [ ]Family   [ ]Team     Pain (Impact on QOL):  Patient complains of pelvic area/lower abdominal pain and low back pain bilaterally - acute on chronic - currently 7/10 - 10/10 at its worst - sharp - aggravated by movement and partially relieved with opioids     PAIN AD Score:     [email or web address] (press ctrl +  left click to view)    Dyspnea:  Yes [ ] No [x ] - [ ]Mild [ ]Moderate [ ]Severe  Anxiety:    Yes x[x ] No [ ] - [ ]Mild [x ]Moderate [ ]Severe  Fatigue:    Yes [x ] No [ ] - [ ]Mild [ ]Moderate [ ]Severe  Nausea:    Yes [ ] No [x ] - [ ]Mild [ ]Moderate [ ]Severe                         Loss of appetite: Yes [x ] No [ ] - [ ]Mild [ x]Moderate [ ]Severe             Constipation:  Yes [ ] No [x ] - [ ]Mild [ ]Moderate [ ]Severe    Other Symptoms:  [x ]All other review of systems negative     Karnofsky Performance Score/Palliative Performance Status Version 2:  70 %    http://palliative.info/resource_material/[email or web address].pdf    PHYSICAL EXAM:  Vital Signs Last 24 Hrs  T(C): 36.7 (18 Jan 2019 15:12), Max: 36.7 (18 Jan 2019 15:12)  T(F): 98 (18 Jan 2019 15:12), Max: 98 (18 Jan 2019 15:12)  HR: 98 (18 Jan 2019 15:58) (97 - 975)  BP: 131/68 (18 Jan 2019 15:58) (131/68 - 136/75)  BP(mean): --  RR: 19 (18 Jan 2019 15:58) (18 - 19)  SpO2: 100% (18 Jan 2019 15:58) (100% - 100%) I&O's Summary    GENERAL:  [x ]Alert  [x ]Oriented x 4  [ ]Lethargic  [ ]Cachexia  [ ]Unarousable  [ x]Verbal  [ ]Non-Verbal  PULMONARY:   [x ]Clear - anteriorly   [ ]Rhonchi        [ ]Right [ ]Left [ ]Bilateral  [ ]Crackles        [ ]Right [ ]Left [ ]Bilateral  [ ]Wheezing     [ ]Right [ ]Left [ ]Bilateral  CARDIOVASCULAR:    [ x]Regular [ ]Irregular [ ]Tachy  [ ]Dex [ ]Murmur [ ]Other  GASTROINTESTINAL:  [x ]Soft  [ ]Distended   [ x]+BS  [x ]Non tender [ ]Tender  [ ]PEG [ ]OGT/ NGT  Last BM: 1/18/19  GENITOURINARY:  [x ]Normal [ ] Incontinent   [ ]Oliguria/Anuria   [ ]Otto  MUSCULOSKELETAL:   [ ]Normal   [x ]Weakness  [ ]Bed/Wheelchair bound [ ]Edema  NEUROLOGIC:   [x ]No focal deficits  [ ] Cognitive impairment  [ ] Dysphagia [ ]Dysarthria [ ] Paresis [ ]Other   SKIN:   [x ]Normal   [ ]Pressure ulcer(s)  [ ]Rash    LABS: Pending         RADIOLOGY & ADDITIONAL STUDIES: Reviewed allscripts   MRI Abdomen - progression of disease in Allscripts from 11/2018     PROTEIN CALORIE MALNUTRITION PRESENT: [ ] Yes [ ] No  [ ] PPSV2 < or = to 30% [ ] significant weight loss  [ ] poor nutritional intake [ ] catabolic state [ ] anasarca         REFERRALS:   [ ]Chaplaincy  [ ] Hospice  [ ]Child Life  [ ]Social Work  [ ]Case management [ ]Holistic Therapy   Goals of Care Discussion Document: HPI: 68 year old female with Endocervical cancer Stage IB2 on diagnosis (5/2017) - As per outpatient provider documentation (Dr. Levine) - patient's states that at the time of diagnosis she was informed that her disease was unresectable based on the site of the tumor - she sought medical opinions at both [facility] and [facility].  RT and chemo were recommended - patient chose to pursue alternative therapies.  Patient presents to ED at the request of Dr. Levine due to uncontrolled pain as an outpatient.      Patient currently sitting up in a stretcher and eating saltines in no acute distress.     PERTINENT PM/SXH:   Subdural Haemorrhage, Nontraumatic  Disturbance of Memory  OA (Osteoarthritis)  c spine herniations  Lumbar Disc herniations   l4 l5  Depression  Mitral Valve Regurgitation  Hyperlipidemia  Chronic Pain    S/P Hip Replacement  Plastic Surgery  S/P Tonsillectomy    FAMILY HISTORY:      SOCIAL HISTORY:   Significant other/partner: Yes [ ]  No [x ] Children:  Yes [ ]  No [x ] Restoration/Spirituality:  Substance hx: Medical Marijuana  Tobacco hx:  Yes [ ] No [x ]   Alcohol hx: Denies   Home Opioid hx:  Yes [x ]   Living Situation: [x ]Home  [ ]Long term care  [ ]Rehab [ ]Other    ADVANCE DIRECTIVES:      [x ] Health Care Proxy(s)  [ ] Surrogate(s)  [ ] Guardian           Name(s): Phone Number(s): Dr. Paige Vergara [phone] - primary Candice Williams [phone] (alternate)    BASELINE (I)ADL(s) (prior to admission):  [place]: [x ]Total  [ ] Moderate [ ]Dependent    Allergies    No Known Allergies    Intolerances    MEDICATIONS  (STANDING):  morphine ER Tablet 30 milliGRAM(s) Oral two times a day    MEDICATIONS  (PRN):  HYDROmorphone  Injectable 1 milliGRAM(s) IV Push every 4 hours PRN Severe Pain (7 - 10)    PRESENT SYMPTOMS: [ ]Unable to obtain due to poor mentation   Source if other than patient:  [ ]Family   [ ]Team     Pain (Impact on QOL):  Patient complains of pelvic area/lower abdominal pain and low back pain bilaterally - acute on chronic - currently 7/10 - 10/10 at its worst - sharp - aggravated by movement and partially relieved with opioids     PAIN AD Score:     [email or web address]/cog/painad.pdf (press ctrl +  left click to view)    Dyspnea:  Yes [ ] No [x ] - [ ]Mild [ ]Moderate [ ]Severe  Anxiety:    Yes x[x ] No [ ] - [ ]Mild [x ]Moderate [ ]Severe  Fatigue:    Yes [x ] No [ ] - [ ]Mild [ ]Moderate [ ]Severe  Nausea:    Yes [ ] No [x ] - [ ]Mild [ ]Moderate [ ]Severe                         Loss of appetite: Yes [x ] No [ ] - [ ]Mild [ x]Moderate [ ]Severe             Constipation:  Yes [ ] No [x ] - [ ]Mild [ ]Moderate [ ]Severe    Other Symptoms:  [x ]All other review of systems negative     Karnofsky Performance Score/Palliative Performance Status Version 2:  70 %    http://palliative.info/resource_material/[email or web address].pdf    PHYSICAL EXAM:  Vital Signs Last 24 Hrs  T(C): 36.7 (18 Jan 2019 15:12), Max: 36.7 (18 Jan 2019 15:12)  T(F): 98 (18 Jan 2019 15:12), Max: 98 (18 Jan 2019 15:12)  HR: 98 (18 Jan 2019 15:58) (97 - 975)  BP: 131/68 (18 Jan 2019 15:58) (131/68 - 136/75)  BP(mean): --  RR: 19 (18 Jan 2019 15:58) (18 - 19)  SpO2: 100% (18 Jan 2019 15:58) (100% - 100%) I&O's Summary    GENERAL:  [x ]Alert  [x ]Oriented x 4  [ ]Lethargic  [ ]Cachexia  [ ]Unarousable  [ x]Verbal  [ ]Non-Verbal  PULMONARY:   [x ]Clear - anteriorly   [ ]Rhonchi        [ ]Right [ ]Left [ ]Bilateral  [ ]Crackles        [ ]Right [ ]Left [ ]Bilateral  [ ]Wheezing     [ ]Right [ ]Left [ ]Bilateral  CARDIOVASCULAR:    [ x]Regular [ ]Irregular [ ]Tachy  [ ]Dex [ ]Murmur [ ]Other  GASTROINTESTINAL:  [x ]Soft  [ ]Distended   [ x]+BS  [x ]Non tender [ ]Tender  [ ]PEG [ ]OGT/ NGT  Last BM: 1/18/19  GENITOURINARY:  [x ]Normal [ ] Incontinent   [ ]Oliguria/Anuria   [ ]Otto  MUSCULOSKELETAL:   [ ]Normal   [x ]Weakness  [ ]Bed/Wheelchair bound [ ]Edema  NEUROLOGIC:   [x ]No focal deficits  [ ] Cognitive impairment  [ ] Dysphagia [ ]Dysarthria [ ] Paresis [ ]Other   SKIN:   [x ]Normal   [ ]Pressure ulcer(s)  [ ]Rash    LABS: Pending         RADIOLOGY & ADDITIONAL STUDIES: Reviewed allscripts   MRI Abdomen - progression of disease in Allscripts from 11/2018     PROTEIN CALORIE MALNUTRITION PRESENT: [ ] Yes [ ] No  [ ] PPSV2 < or = to 30% [ ] significant weight loss  [ ] poor nutritional intake [ ] catabolic state [ ] anasarca         REFERRALS:   [ ]Chaplaincy  [ ] Hospice  [ ]Child Life  [ ]Social Work  [ ]Case management [ ]Holistic Therapy   Goals of Care Discussion Document:

## 2019-01-18 NOTE — ED PROVIDER NOTE - CARE PLAN
Assessment and plan of treatment:	69yo F w/ hx of met endocervical cancer p/w cc of pain. Palliative consulted for pain management at this time. Will f/u recommendation. Principal Discharge DX:	Chronic pain

## 2019-01-18 NOTE — CONSULT NOTE ADULT - PROBLEM SELECTOR RECOMMENDATION 4
Patient with a MOLST form indicating DNR/DNI, no feeding tube, comfort measures, No IV fluids, IV antibiotics OK, and no hospitalizations (patient requesting hospitalization today) - confirmed DNR/DNI status with patient - ED staff aware - copy to be placed in chart.  Psychosocial support provided.  Will continue to follow for ongoing symptom management.

## 2019-01-18 NOTE — CONSULT NOTE ADULT - ASSESSMENT
68 year old female with Endocervical cancer Stage IB2 on diagnosis (5/2017).  Palliative consulted for symptom management.

## 2019-01-18 NOTE — H&P ADULT - PROBLEM SELECTOR PLAN 1
- increased pain not controlled with PO morphine ER and PO hydromorphone q4 outpatient  - Palliative consulted - c/w morphine 30 BID as seems patient had not been taking regularly, started on dilaudid IV 1mg q4 prn for now - currently controlled, will uptirate if needed, will continue home neurontin dose as well - 300 BID  - follows with Dr Abe Thompson outpatient  - c/w home bowel regimen - senna and lactulose  - Istop reference #[id number] - increased pain not controlled with PO morphine ER and PO hydromorphone q4 outpatient  - Palliative consulted - c/w morphine 30 BID as seems patient had not been taking regularly, started on dilaudid IV 1mg q4 prn for now - currently controlled, will uptirate as needed, will continue home neurontin dose as well - 300 BID  - follows with Dr Abe Thompson outpatient  - c/w home bowel regimen - senna and lactulose  - Istop reference #[id number]

## 2019-01-19 PROCEDURE — 99233 SBSQ HOSP IP/OBS HIGH 50: CPT

## 2019-01-19 RX ORDER — OXYCODONE HYDROCHLORIDE 5 MG/1
10 TABLET ORAL EVERY 4 HOURS
Qty: 0 | Refills: 0 | Status: DISCONTINUED | OUTPATIENT
Start: 2019-01-19 | End: 2019-01-19

## 2019-01-19 RX ORDER — HYDROMORPHONE HYDROCHLORIDE 2 MG/ML
1.5 INJECTION INTRAMUSCULAR; INTRAVENOUS; SUBCUTANEOUS ONCE
Qty: 0 | Refills: 0 | Status: DISCONTINUED | OUTPATIENT
Start: 2019-01-19 | End: 2019-01-19

## 2019-01-19 RX ORDER — HYDROMORPHONE HYDROCHLORIDE 2 MG/ML
1 INJECTION INTRAMUSCULAR; INTRAVENOUS; SUBCUTANEOUS ONCE
Qty: 0 | Refills: 0 | Status: DISCONTINUED | OUTPATIENT
Start: 2019-01-19 | End: 2019-01-19

## 2019-01-19 RX ORDER — HYDROMORPHONE HYDROCHLORIDE 2 MG/ML
1.5 INJECTION INTRAMUSCULAR; INTRAVENOUS; SUBCUTANEOUS
Qty: 0 | Refills: 0 | Status: DISCONTINUED | OUTPATIENT
Start: 2019-01-19 | End: 2019-01-19

## 2019-01-19 RX ORDER — POLYETHYLENE GLYCOL 3350 17 G/17G
17 POWDER, FOR SOLUTION ORAL
Qty: 0 | Refills: 0 | Status: DISCONTINUED | OUTPATIENT
Start: 2019-01-19 | End: 2019-01-31

## 2019-01-19 RX ORDER — MORPHINE SULFATE 50 MG/1
30 CAPSULE, EXTENDED RELEASE ORAL THREE TIMES A DAY
Qty: 0 | Refills: 0 | Status: DISCONTINUED | OUTPATIENT
Start: 2019-01-19 | End: 2019-01-22

## 2019-01-19 RX ORDER — HYDROMORPHONE HYDROCHLORIDE 2 MG/ML
1 INJECTION INTRAMUSCULAR; INTRAVENOUS; SUBCUTANEOUS
Qty: 0 | Refills: 0 | Status: DISCONTINUED | OUTPATIENT
Start: 2019-01-19 | End: 2019-01-19

## 2019-01-19 RX ORDER — HYDROMORPHONE HYDROCHLORIDE 2 MG/ML
3 INJECTION INTRAMUSCULAR; INTRAVENOUS; SUBCUTANEOUS ONCE
Qty: 0 | Refills: 0 | Status: DISCONTINUED | OUTPATIENT
Start: 2019-01-19 | End: 2019-01-19

## 2019-01-19 RX ORDER — HYDROMORPHONE HYDROCHLORIDE 2 MG/ML
2 INJECTION INTRAMUSCULAR; INTRAVENOUS; SUBCUTANEOUS ONCE
Qty: 0 | Refills: 0 | Status: DISCONTINUED | OUTPATIENT
Start: 2019-01-19 | End: 2019-01-19

## 2019-01-19 RX ORDER — POLYETHYLENE GLYCOL 3350 17 G/17G
17 POWDER, FOR SOLUTION ORAL DAILY
Qty: 0 | Refills: 0 | Status: DISCONTINUED | OUTPATIENT
Start: 2019-01-19 | End: 2019-01-19

## 2019-01-19 RX ORDER — HYDROMORPHONE HYDROCHLORIDE 2 MG/ML
2 INJECTION INTRAMUSCULAR; INTRAVENOUS; SUBCUTANEOUS
Qty: 0 | Refills: 0 | Status: DISCONTINUED | OUTPATIENT
Start: 2019-01-19 | End: 2019-01-20

## 2019-01-19 RX ADMIN — HYDROMORPHONE HYDROCHLORIDE 1.5 MILLIGRAM(S): 2 INJECTION INTRAMUSCULAR; INTRAVENOUS; SUBCUTANEOUS at 02:05

## 2019-01-19 RX ADMIN — HYDROMORPHONE HYDROCHLORIDE 2 MILLIGRAM(S): 2 INJECTION INTRAMUSCULAR; INTRAVENOUS; SUBCUTANEOUS at 23:15

## 2019-01-19 RX ADMIN — HYDROMORPHONE HYDROCHLORIDE 1.5 MILLIGRAM(S): 2 INJECTION INTRAMUSCULAR; INTRAVENOUS; SUBCUTANEOUS at 06:45

## 2019-01-19 RX ADMIN — MORPHINE SULFATE 30 MILLIGRAM(S): 50 CAPSULE, EXTENDED RELEASE ORAL at 16:53

## 2019-01-19 RX ADMIN — MORPHINE SULFATE 30 MILLIGRAM(S): 50 CAPSULE, EXTENDED RELEASE ORAL at 22:00

## 2019-01-19 RX ADMIN — HYDROMORPHONE HYDROCHLORIDE 1.5 MILLIGRAM(S): 2 INJECTION INTRAMUSCULAR; INTRAVENOUS; SUBCUTANEOUS at 07:30

## 2019-01-19 RX ADMIN — GABAPENTIN 300 MILLIGRAM(S): 400 CAPSULE ORAL at 18:15

## 2019-01-19 RX ADMIN — HYDROMORPHONE HYDROCHLORIDE 1 MILLIGRAM(S): 2 INJECTION INTRAMUSCULAR; INTRAVENOUS; SUBCUTANEOUS at 00:23

## 2019-01-19 RX ADMIN — Medication 2 MILLIGRAM(S): at 21:16

## 2019-01-19 RX ADMIN — HYDROMORPHONE HYDROCHLORIDE 1.5 MILLIGRAM(S): 2 INJECTION INTRAMUSCULAR; INTRAVENOUS; SUBCUTANEOUS at 10:24

## 2019-01-19 RX ADMIN — GABAPENTIN 300 MILLIGRAM(S): 400 CAPSULE ORAL at 07:13

## 2019-01-19 RX ADMIN — OXYCODONE HYDROCHLORIDE 10 MILLIGRAM(S): 5 TABLET ORAL at 15:55

## 2019-01-19 RX ADMIN — MORPHINE SULFATE 30 MILLIGRAM(S): 50 CAPSULE, EXTENDED RELEASE ORAL at 06:45

## 2019-01-19 RX ADMIN — SENNA PLUS 2 TABLET(S): 8.6 TABLET ORAL at 21:17

## 2019-01-19 RX ADMIN — Medication 1 DROP(S): at 21:16

## 2019-01-19 RX ADMIN — HYDROMORPHONE HYDROCHLORIDE 2 MILLIGRAM(S): 2 INJECTION INTRAMUSCULAR; INTRAVENOUS; SUBCUTANEOUS at 04:00

## 2019-01-19 RX ADMIN — MORPHINE SULFATE 30 MILLIGRAM(S): 50 CAPSULE, EXTENDED RELEASE ORAL at 07:30

## 2019-01-19 RX ADMIN — HYDROMORPHONE HYDROCHLORIDE 1 MILLIGRAM(S): 2 INJECTION INTRAMUSCULAR; INTRAVENOUS; SUBCUTANEOUS at 01:00

## 2019-01-19 RX ADMIN — MORPHINE SULFATE 30 MILLIGRAM(S): 50 CAPSULE, EXTENDED RELEASE ORAL at 21:15

## 2019-01-19 RX ADMIN — HYDROMORPHONE HYDROCHLORIDE 2 MILLIGRAM(S): 2 INJECTION INTRAMUSCULAR; INTRAVENOUS; SUBCUTANEOUS at 23:30

## 2019-01-19 RX ADMIN — HYDROMORPHONE HYDROCHLORIDE 3 MILLIGRAM(S): 2 INJECTION INTRAMUSCULAR; INTRAVENOUS; SUBCUTANEOUS at 18:14

## 2019-01-19 RX ADMIN — OXYCODONE HYDROCHLORIDE 10 MILLIGRAM(S): 5 TABLET ORAL at 15:08

## 2019-01-19 RX ADMIN — MORPHINE SULFATE 30 MILLIGRAM(S): 50 CAPSULE, EXTENDED RELEASE ORAL at 15:07

## 2019-01-19 RX ADMIN — HYDROMORPHONE HYDROCHLORIDE 1.5 MILLIGRAM(S): 2 INJECTION INTRAMUSCULAR; INTRAVENOUS; SUBCUTANEOUS at 01:31

## 2019-01-19 RX ADMIN — HYDROMORPHONE HYDROCHLORIDE 3 MILLIGRAM(S): 2 INJECTION INTRAMUSCULAR; INTRAVENOUS; SUBCUTANEOUS at 18:55

## 2019-01-19 RX ADMIN — HYDROMORPHONE HYDROCHLORIDE 1.5 MILLIGRAM(S): 2 INJECTION INTRAMUSCULAR; INTRAVENOUS; SUBCUTANEOUS at 10:04

## 2019-01-19 RX ADMIN — HYDROMORPHONE HYDROCHLORIDE 2 MILLIGRAM(S): 2 INJECTION INTRAMUSCULAR; INTRAVENOUS; SUBCUTANEOUS at 03:30

## 2019-01-19 RX ADMIN — Medication 5 MILLIGRAM(S): at 10:03

## 2019-01-19 NOTE — PROGRESS NOTE ADULT - SUBJECTIVE AND OBJECTIVE BOX
Patient is a 68y old  Female who presents with a chief complaint of uncontrolled pain (18 Jan 2019 19:18)        SUBJECTIVE / OVERNIGHT EVENTS: Pt continues to have uncontrolled pelvic pain despite asking for multiple PRN doses and additional PRN doses of IV dilaudid last night.  She reports that in the past, she's had the best response to combination of Opana and Roxycodone, which I explained to her we don't have on formularly.  Also with severe opiate induced constipation, hasn't gone in 4 days       MEDICATIONS  (STANDING):  gabapentin 300 milliGRAM(s) Oral two times a day  morphine ER Tablet 30 milliGRAM(s) Oral three times a day  polyethylene glycol 3350 17 Gram(s) Oral two times a day  senna 2 Tablet(s) Oral at bedtime  sorbitol 70%/mineral oil/magnesium hydroxide/glycerin Enema 120 milliLiter(s) Rectal once    MEDICATIONS  (PRN):  artificial tears (preservative free) Ophthalmic Solution 1 Drop(s) Both EYES two times a day PRN Dry Eyes  bisacodyl 5 milliGRAM(s) Oral daily PRN Constipation  clonazePAM Tablet 2 milliGRAM(s) Oral two times a day PRN anxiety  oxyCODONE    IR 10 milliGRAM(s) Oral every 4 hours PRN For mdoerate to severe pain      Vital Signs Last 24 Hrs  T(C): 36.8 (19 Jan 2019 12:47), Max: 37 (18 Jan 2019 21:41)  T(F): 98.3 (19 Jan 2019 12:47), Max: 98.6 (18 Jan 2019 21:41)  HR: 76 (19 Jan 2019 12:47) (70 - 975)  BP: 121/59 (19 Jan 2019 12:47) (117/54 - 165/83)  BP(mean): --  RR: 18 (19 Jan 2019 12:47) (18 - 20)  SpO2: 98% (19 Jan 2019 12:47) (96% - 100%)  CAPILLARY BLOOD GLUCOSE        PHYSICAL EXAM  GENERAL: Mild distress 2/2 pain, well-developed  HEAD:  Atraumatic, Normocephalic  EYES: EOMI, PERRLA, conjunctiva and sclera clear  NECK: Supple, No JVD  CHEST/LUNG: Clear to auscultation bilaterally; No wheeze  HEART: Regular rate and rhythm; No murmurs, rubs, or gallops  ABDOMEN: Soft, +tenderness in lower pelvic region, +distended; Bowel sounds present  PSYCH: AAOx3  SKIN: No rashes or lesions    LABS:                        9.9    8.81  )-----------( 403      ( 18 Jan 2019 17:53 )             32.9     01-18    135  |  95<L>  |  6<L>  ----------------------------<  107<H>  3.8   |  26  |  0.62    Ca    9.7      18 Jan 2019 17:53    TPro  7.1  /  Alb  4.0  /  TBili  0.3  /  DBili  x   /  AST  13  /  ALT  10  /  AlkPhos  64  01-18        RADIOLOGY & ADDITIONAL TESTS:    Imaging Personally Reviewed:  < from: CT Abdomen and Pelvis w/ IV Cont (05.17.17 @ 17:46) >    IMPRESSION: Thickening of the endometrium of the lower uterine segment   and endocervical canal on sagittal view.  No guru para-aortic adenopathy nor pelvic adenopathy.  Cholelithiasis with no evidence of acute cholecystitis.  Prominence of the common bile duct. Suggest MRCP if there are obstructive   symptoms.  Moderate diffuse hepatic steatosis.  Fat-containing umbilical hernia.      < end of copied text >    Consultant(s) Notes Reviewed:  Palliative     Care Discussed with Consultants/Other Providers: Palliative outpatient Dr. Levine

## 2019-01-19 NOTE — PROGRESS NOTE ADULT - PROBLEM SELECTOR PLAN 2
- MRI in 11/2018 confirming progression of disease  - patient not interested in chemotherapy, has been only pursuing alternative measures however currently requesting rad/onc eval - per patient and friend at bedside she recently saw a radiation oncologist outside of [facility] who recommended radiation  - hospice referral made 12/19/18 however patient has not yet had a consultation

## 2019-01-19 NOTE — PROGRESS NOTE ADULT - PROBLEM SELECTOR PLAN 3
- prescribed clonazepam 2mg BID - per patient takes it when needed however has been taking it more at night to help with sleep  - will c/w 2mg BID prn while admitted  - last script sent 1/7 and filled 1/17  Istop reference #[id number]

## 2019-01-19 NOTE — PROGRESS NOTE ADULT - PROBLEM SELECTOR PLAN 1
- increased pain not controlled with PO morphine ER and PO hydromorphone q4 outpatient, pt remains with suboptimally controlled pain despite IV Dilaudid q3 hours.   - Discussed care with Dr. Levine, recommend increasing MS contin to TID and replacing IV Dilaudid with PO Oxycodone as pt seems to respond better to oxycodone.   - Pt has tried methadone in the past and didn't like the side effects of nausea   - c/w home bowel regimen - senna, colace, miralax daily and add SMOG enema x1 today   - Istop reference #[id number]

## 2019-01-20 PROCEDURE — 99233 SBSQ HOSP IP/OBS HIGH 50: CPT

## 2019-01-20 RX ORDER — IBUPROFEN 200 MG
600 TABLET ORAL ONCE
Qty: 0 | Refills: 0 | Status: COMPLETED | OUTPATIENT
Start: 2019-01-20 | End: 2019-01-20

## 2019-01-20 RX ORDER — SODIUM CHLORIDE 9 MG/ML
1000 INJECTION INTRAMUSCULAR; INTRAVENOUS; SUBCUTANEOUS
Qty: 0 | Refills: 0 | Status: DISCONTINUED | OUTPATIENT
Start: 2019-01-20 | End: 2019-01-26

## 2019-01-20 RX ORDER — NALOXONE HYDROCHLORIDE 4 MG/.1ML
0.1 SPRAY NASAL
Qty: 0 | Refills: 0 | Status: DISCONTINUED | OUTPATIENT
Start: 2019-01-20 | End: 2019-01-31

## 2019-01-20 RX ORDER — HYDROMORPHONE HYDROCHLORIDE 2 MG/ML
30 INJECTION INTRAMUSCULAR; INTRAVENOUS; SUBCUTANEOUS
Qty: 0 | Refills: 0 | Status: DISCONTINUED | OUTPATIENT
Start: 2019-01-20 | End: 2019-01-21

## 2019-01-20 RX ORDER — ONDANSETRON 8 MG/1
4 TABLET, FILM COATED ORAL EVERY 6 HOURS
Qty: 0 | Refills: 0 | Status: DISCONTINUED | OUTPATIENT
Start: 2019-01-20 | End: 2019-01-31

## 2019-01-20 RX ADMIN — HYDROMORPHONE HYDROCHLORIDE 2 MILLIGRAM(S): 2 INJECTION INTRAMUSCULAR; INTRAVENOUS; SUBCUTANEOUS at 05:00

## 2019-01-20 RX ADMIN — HYDROMORPHONE HYDROCHLORIDE 2 MILLIGRAM(S): 2 INJECTION INTRAMUSCULAR; INTRAVENOUS; SUBCUTANEOUS at 12:10

## 2019-01-20 RX ADMIN — MORPHINE SULFATE 30 MILLIGRAM(S): 50 CAPSULE, EXTENDED RELEASE ORAL at 06:54

## 2019-01-20 RX ADMIN — Medication 600 MILLIGRAM(S): at 14:10

## 2019-01-20 RX ADMIN — SODIUM CHLORIDE 30 MILLILITER(S): 9 INJECTION INTRAMUSCULAR; INTRAVENOUS; SUBCUTANEOUS at 19:58

## 2019-01-20 RX ADMIN — HYDROMORPHONE HYDROCHLORIDE 30 MILLILITER(S): 2 INJECTION INTRAMUSCULAR; INTRAVENOUS; SUBCUTANEOUS at 19:58

## 2019-01-20 RX ADMIN — POLYETHYLENE GLYCOL 3350 17 GRAM(S): 17 POWDER, FOR SOLUTION ORAL at 17:10

## 2019-01-20 RX ADMIN — HYDROMORPHONE HYDROCHLORIDE 2 MILLIGRAM(S): 2 INJECTION INTRAMUSCULAR; INTRAVENOUS; SUBCUTANEOUS at 15:08

## 2019-01-20 RX ADMIN — MORPHINE SULFATE 30 MILLIGRAM(S): 50 CAPSULE, EXTENDED RELEASE ORAL at 23:35

## 2019-01-20 RX ADMIN — GABAPENTIN 300 MILLIGRAM(S): 400 CAPSULE ORAL at 06:54

## 2019-01-20 RX ADMIN — HYDROMORPHONE HYDROCHLORIDE 2 MILLIGRAM(S): 2 INJECTION INTRAMUSCULAR; INTRAVENOUS; SUBCUTANEOUS at 08:00

## 2019-01-20 RX ADMIN — GABAPENTIN 300 MILLIGRAM(S): 400 CAPSULE ORAL at 17:10

## 2019-01-20 RX ADMIN — SENNA PLUS 2 TABLET(S): 8.6 TABLET ORAL at 22:35

## 2019-01-20 RX ADMIN — HYDROMORPHONE HYDROCHLORIDE 2 MILLIGRAM(S): 2 INJECTION INTRAMUSCULAR; INTRAVENOUS; SUBCUTANEOUS at 07:43

## 2019-01-20 RX ADMIN — MORPHINE SULFATE 30 MILLIGRAM(S): 50 CAPSULE, EXTENDED RELEASE ORAL at 07:00

## 2019-01-20 RX ADMIN — HYDROMORPHONE HYDROCHLORIDE 2 MILLIGRAM(S): 2 INJECTION INTRAMUSCULAR; INTRAVENOUS; SUBCUTANEOUS at 01:15

## 2019-01-20 RX ADMIN — Medication 600 MILLIGRAM(S): at 13:25

## 2019-01-20 RX ADMIN — POLYETHYLENE GLYCOL 3350 17 GRAM(S): 17 POWDER, FOR SOLUTION ORAL at 06:54

## 2019-01-20 RX ADMIN — MORPHINE SULFATE 30 MILLIGRAM(S): 50 CAPSULE, EXTENDED RELEASE ORAL at 22:35

## 2019-01-20 RX ADMIN — HYDROMORPHONE HYDROCHLORIDE 2 MILLIGRAM(S): 2 INJECTION INTRAMUSCULAR; INTRAVENOUS; SUBCUTANEOUS at 04:24

## 2019-01-20 RX ADMIN — MORPHINE SULFATE 30 MILLIGRAM(S): 50 CAPSULE, EXTENDED RELEASE ORAL at 14:21

## 2019-01-20 RX ADMIN — HYDROMORPHONE HYDROCHLORIDE 2 MILLIGRAM(S): 2 INJECTION INTRAMUSCULAR; INTRAVENOUS; SUBCUTANEOUS at 11:54

## 2019-01-20 RX ADMIN — ONDANSETRON 4 MILLIGRAM(S): 8 TABLET, FILM COATED ORAL at 20:23

## 2019-01-20 NOTE — PROGRESS NOTE ADULT - PROBLEM SELECTOR PLAN 1
- increased pain not controlled with PO morphine ER and PO hydromorphone q4 outpatient, pt remains with suboptimally controlled pain despite IV Dilaudid q3 hours.   - Discussed care with Dr. Levine, recommend increasing MS contin to TID and replacing IV Dilaudid with PO Oxycodone as pt seems to respond better to oxycodone: pt did not respond well to this and now back on IV Dilaudid 2mg IV q3hrs.   - Pt has tried methadone in the past and didn't like the side effects of nausea   - c/w home bowel regimen - senna, colace, miralax daily and Lactulose, SMOG enema x1 given 1/19

## 2019-01-20 NOTE — PROGRESS NOTE ADULT - PROBLEM SELECTOR PLAN 2
- MRI in 11/2018 confirming progression of disease  - patient not interested in chemotherapy, has been only pursuing alternative measures however currently requesting rad/onc eval (will need to call Tuesday)  - hospice referral made 12/19/18 however patient has not yet had a consultation

## 2019-01-20 NOTE — PROGRESS NOTE ADULT - SUBJECTIVE AND OBJECTIVE BOX
Patient is a 68y old  Female who presents with a chief complaint of uncontrolled pain (19 Jan 2019 13:14)        SUBJECTIVE / OVERNIGHT EVENTS: Pt reports pain is slighlty better controlled on the increased dose of IV Dilaudid.  We triedPO oxycodone, which she did NOT find relief from.  Still hasn't had BM       MEDICATIONS  (STANDING):  gabapentin 300 milliGRAM(s) Oral two times a day  morphine ER Tablet 30 milliGRAM(s) Oral three times a day  polyethylene glycol 3350 17 Gram(s) Oral two times a day  senna 2 Tablet(s) Oral at bedtime    MEDICATIONS  (PRN):  artificial tears (preservative free) Ophthalmic Solution 1 Drop(s) Both EYES two times a day PRN Dry Eyes  bisacodyl 5 milliGRAM(s) Oral daily PRN Constipation  clonazePAM Tablet 2 milliGRAM(s) Oral two times a day PRN anxiety  HYDROmorphone  Injectable 2 milliGRAM(s) IV Push every 3 hours PRN Moderate to Severe Pain      Vital Signs Last 24 Hrs  T(C): 36.5 (20 Jan 2019 12:40), Max: 36.9 (20 Jan 2019 06:52)  T(F): 97.7 (20 Jan 2019 12:40), Max: 98.5 (20 Jan 2019 06:52)  HR: 94 (20 Jan 2019 12:40) (77 - 94)  BP: 143/74 (20 Jan 2019 12:40) (115/62 - 150/80)  BP(mean): --  RR: 18 (20 Jan 2019 12:40) (16 - 19)  SpO2: 100% (20 Jan 2019 12:40) (95% - 100%)  CAPILLARY BLOOD GLUCOSE        PHYSICAL EXAM  GENERAL: Mild distress 2/2 pain, well-developed, anxious  HEAD:  Atraumatic, Normocephalic  EYES: EOMI, PERRLA, conjunctiva and sclera clear  NECK: Supple, No JVD  CHEST/LUNG: Clear to auscultation bilaterally; No wheeze  HEART: Regular rate and rhythm; No murmurs, rubs, or gallops  ABDOMEN: Soft, +tenderness in pelvis, +distended; Bowel sounds present  PSYCH: AAOx3  SKIN: No rashes or lesions    LABS:                        9.9    8.81  )-----------( 403      ( 18 Jan 2019 17:53 )             32.9     01-18    135  |  95<L>  |  6<L>  ----------------------------<  107<H>  3.8   |  26  |  0.62    Ca    9.7      18 Jan 2019 17:53    TPro  7.1  /  Alb  4.0  /  TBili  0.3  /  DBili  x   /  AST  13  /  ALT  10  /  AlkPhos  64  01-18        Consultant(s) Notes Reviewed:  Palliative

## 2019-01-20 NOTE — PROGRESS NOTE ADULT - PROBLEM SELECTOR PLAN 3
- prescribed clonazepam 2mg BID - per patient takes it when needed however has been taking it more at night to help with sleep  - will c/w 2mg BID prn while admitted  - last script sent 1/7 and filled 1/17  Istop reference #[id number]

## 2019-01-20 NOTE — CHART NOTE - NSCHARTNOTEFT_GEN_A_CORE
Palliative follow up requested to assist with pain management.     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 Levine) - patient's states that at the time of diagnosis she was informed that her disease was unresectable based on the site of the tumor - she sought medical opinions at both [facility] [place] [facility] and [facility].  RT and chemo were recommended - patient chose to pursue alternative therapies.  Patient presents to ED at the request of Dr. Levine due to uncontrolled pain as an outpatient on Friday January 18, 2019.     Since admitted, patient was placed on MS Contrin 30mg BID initially with IV Dilaudid 2mg q3h PRN breakthrough pain.   Primary team discussed case with Dr. Levine, recommend increasing MS contin to TID and replacing IV Dilaudid with PO Oxycodone as pt seems to respond better to oxycodone: due to suboptimal response to oxycodone patient was placed back on IV Dilaudid 2mg q3h PRN pain. Received total of 5 rescue doses of IV Dilaudid in the past 27 hours.     Palliative now reconsulted for worsening pain symptoms- located in the abdomen, not well controlled on current regimen.   Patient remains alert and oriented x3, able to communicate her needs therefore team requesting assistance in initiating PCA pump for better symptom control.     Recommendations:   Start IV Dilaudid PCA Pump with the following settings:   No continuous rate. Demand dose: 1mg, Lockout: 30 minutes, 4 hour limit: 8mg.  Will hold off on Clinician Rescue Bolus unless pain remains uncontrolled.   HOLD/DISCONTINUE PCA pump if patient is oversedated, or with RR <10.   Narcan 0.5mg q2-3 minutes for 3 doses PRN oversedation, RR <10.   Ensure bowel regimen, monitor for bowel movements.   Reconsult Palliative urgently if symptoms acutely worsen, refractory to adjusted regimen.   Palliative will continue to follow for symptom management.   Plan discussed with primary and palliative attendings. Palliative follow up requested to assist with pain management.     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 Levine) - patient's states that at the time of diagnosis she was informed that her disease was unresectable based on the site of the tumor - she sought medical opinions at both [facility] and [facility].  RT and chemo were recommended - patient chose to pursue alternative therapies.  Patient presents to ED at the request of Dr. Levine due to uncontrolled pain as an outpatient on Friday January 18, 2019.     Since admitted, patient was placed on MS Contrin 30mg BID initially with IV Dilaudid 2mg q3h PRN breakthrough pain.   Primary team discussed case with Dr. Levine, recommend increasing MS contin to TID and replacing IV Dilaudid with PO Oxycodone as pt seems to respond better to oxycodone: due to suboptimal response to oxycodone patient was placed back on IV Dilaudid 2mg q3h PRN pain. Received total of 5 rescue doses of IV Dilaudid in the past 27 hours.     Palliative now reconsulted for worsening pain symptoms- located in the abdomen, not well controlled on current regimen.   Patient remains alert and oriented x3, able to communicate her needs therefore team requesting assistance in initiating PCA pump for better symptom control.   Per GOC: patient is DNR/DNI.     Recommendations:   Start IV Dilaudid PCA Pump with the following settings:   No continuous rate. Demand dose: 1mg, Lockout: 30 minutes, 4 hour limit: 8mg.  Will hold off on Clinician Rescue Bolus unless pain remains uncontrolled.   HOLD/DISCONTINUE PCA pump if patient is oversedated, or with RR <10.   Ensure bowel regimen, monitor for bowel movements.   Reconsult Palliative urgently if symptoms acutely worsen, refractory to adjusted regimen.   Palliative will continue to follow for symptom management.   Plan discussed with primary and palliative attendings.

## 2019-01-21 LAB
ANION GAP SERPL CALC-SCNC: 14 MMO/L — SIGNIFICANT CHANGE UP (ref 7–14)
BUN SERPL-MCNC: 9 MG/DL — SIGNIFICANT CHANGE UP (ref 7–23)
CALCIUM SERPL-MCNC: 9.3 MG/DL — SIGNIFICANT CHANGE UP (ref 8.4–10.5)
CHLORIDE SERPL-SCNC: 96 MMOL/L — LOW (ref 98–107)
CO2 SERPL-SCNC: 26 MMOL/L — SIGNIFICANT CHANGE UP (ref 22–31)
CREAT SERPL-MCNC: 0.72 MG/DL — SIGNIFICANT CHANGE UP (ref 0.5–1.3)
GLUCOSE SERPL-MCNC: 96 MG/DL — SIGNIFICANT CHANGE UP (ref 70–99)
HCT VFR BLD CALC: 31.4 % — LOW (ref 34.5–45)
HGB BLD-MCNC: 9.4 G/DL — LOW (ref 11.5–15.5)
MCHC RBC-ENTMCNC: 24.9 PG — LOW (ref 27–34)
MCHC RBC-ENTMCNC: 29.9 % — LOW (ref 32–36)
MCV RBC AUTO: 83.3 FL — SIGNIFICANT CHANGE UP (ref 80–100)
NRBC # FLD: 0 K/UL — LOW (ref 25–125)
PLATELET # BLD AUTO: 417 K/UL — HIGH (ref 150–400)
PMV BLD: 8.9 FL — SIGNIFICANT CHANGE UP (ref 7–13)
POTASSIUM SERPL-MCNC: 4.2 MMOL/L — SIGNIFICANT CHANGE UP (ref 3.5–5.3)
POTASSIUM SERPL-SCNC: 4.2 MMOL/L — SIGNIFICANT CHANGE UP (ref 3.5–5.3)
RBC # BLD: 3.77 M/UL — LOW (ref 3.8–5.2)
RBC # FLD: 14.7 % — HIGH (ref 10.3–14.5)
SODIUM SERPL-SCNC: 136 MMOL/L — SIGNIFICANT CHANGE UP (ref 135–145)
WBC # BLD: 7.24 K/UL — SIGNIFICANT CHANGE UP (ref 3.8–10.5)
WBC # FLD AUTO: 7.24 K/UL — SIGNIFICANT CHANGE UP (ref 3.8–10.5)

## 2019-01-21 PROCEDURE — 99233 SBSQ HOSP IP/OBS HIGH 50: CPT

## 2019-01-21 RX ORDER — HYDROMORPHONE HYDROCHLORIDE 2 MG/ML
30 INJECTION INTRAMUSCULAR; INTRAVENOUS; SUBCUTANEOUS
Qty: 0 | Refills: 0 | Status: DISCONTINUED | OUTPATIENT
Start: 2019-01-21 | End: 2019-01-22

## 2019-01-21 RX ORDER — LACTULOSE 10 G/15ML
10 SOLUTION ORAL
Qty: 0 | Refills: 0 | Status: DISCONTINUED | OUTPATIENT
Start: 2019-01-21 | End: 2019-01-22

## 2019-01-21 RX ADMIN — POLYETHYLENE GLYCOL 3350 17 GRAM(S): 17 POWDER, FOR SOLUTION ORAL at 05:45

## 2019-01-21 RX ADMIN — MORPHINE SULFATE 30 MILLIGRAM(S): 50 CAPSULE, EXTENDED RELEASE ORAL at 05:45

## 2019-01-21 RX ADMIN — GABAPENTIN 300 MILLIGRAM(S): 400 CAPSULE ORAL at 18:13

## 2019-01-21 RX ADMIN — MORPHINE SULFATE 30 MILLIGRAM(S): 50 CAPSULE, EXTENDED RELEASE ORAL at 22:06

## 2019-01-21 RX ADMIN — ONDANSETRON 4 MILLIGRAM(S): 8 TABLET, FILM COATED ORAL at 22:19

## 2019-01-21 RX ADMIN — HYDROMORPHONE HYDROCHLORIDE 30 MILLILITER(S): 2 INJECTION INTRAMUSCULAR; INTRAVENOUS; SUBCUTANEOUS at 07:41

## 2019-01-21 RX ADMIN — SODIUM CHLORIDE 30 MILLILITER(S): 9 INJECTION INTRAMUSCULAR; INTRAVENOUS; SUBCUTANEOUS at 19:21

## 2019-01-21 RX ADMIN — LACTULOSE 10 GRAM(S): 10 SOLUTION ORAL at 18:13

## 2019-01-21 RX ADMIN — POLYETHYLENE GLYCOL 3350 17 GRAM(S): 17 POWDER, FOR SOLUTION ORAL at 18:13

## 2019-01-21 RX ADMIN — MORPHINE SULFATE 30 MILLIGRAM(S): 50 CAPSULE, EXTENDED RELEASE ORAL at 13:13

## 2019-01-21 RX ADMIN — GABAPENTIN 300 MILLIGRAM(S): 400 CAPSULE ORAL at 05:45

## 2019-01-21 RX ADMIN — Medication 2 MILLIGRAM(S): at 11:20

## 2019-01-21 RX ADMIN — SODIUM CHLORIDE 30 MILLILITER(S): 9 INJECTION INTRAMUSCULAR; INTRAVENOUS; SUBCUTANEOUS at 07:41

## 2019-01-21 RX ADMIN — SENNA PLUS 2 TABLET(S): 8.6 TABLET ORAL at 22:06

## 2019-01-21 RX ADMIN — Medication 2 MILLIGRAM(S): at 02:48

## 2019-01-21 RX ADMIN — HYDROMORPHONE HYDROCHLORIDE 30 MILLILITER(S): 2 INJECTION INTRAMUSCULAR; INTRAVENOUS; SUBCUTANEOUS at 12:47

## 2019-01-21 RX ADMIN — HYDROMORPHONE HYDROCHLORIDE 30 MILLILITER(S): 2 INJECTION INTRAMUSCULAR; INTRAVENOUS; SUBCUTANEOUS at 19:21

## 2019-01-21 NOTE — PROGRESS NOTE ADULT - PROBLEM SELECTOR PLAN 1
- increased pain not controlled with PO morphine ER and PO hydromorphone q4 outpatient, pt remains with suboptimally controlled pain despite IV 2mg Dilaudid q3 hours.   - Discussed with palliative and adjusted IV PCA Dilaudid pump, demand dose now increased to 2mg  - Pt has tried methadone in the past and didn't like the side effects of nausea   - c/w home bowel regimen - senna, colace, miralax daily and Lactulose, SMOG enema x1 given 1/19 with no effect.   - Outpt palliative MD Dr. Levine also aware of hospital course

## 2019-01-21 NOTE — CHART NOTE - NSCHARTNOTEFT_GEN_A_CORE
Palliative follow up requested to assist with pain management.     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 Levine) - patient's states that at the time of diagnosis she was informed that her disease was unresectable based on the site of the tumor - she sought medical opinions at both [facility] and [facility].  RT and chemo were recommended - patient chose to pursue alternative therapies.  Patient presents to ED at the request of Dr. Levine due to uncontrolled pain as an outpatient on Friday January 18, 2019.     Since admitted, patient was placed on MS Contrin 30mg BID initially with IV Dilaudid 2mg q3h PRN breakthrough pain.   Primary team discussed case with Dr. Levine, recommend increasing MS contin to TID and replacing IV Dilaudid with PO Oxycodone as pt seems to respond better to oxycodone: due to suboptimal response to oxycodone patient was placed back on IV Dilaudid 2mg q3h PRN pain. Received total of 5 rescue doses of IV Dilaudid in the past 27 hours.   On 1/20 patient was started on PCA pump: No continuous rate. Demand dose: 1mg, Lockout: 30 minutes, 4 hour limit: 8mg.    Palliative f/u requested as primary team/attending reports uncontrolled pain despite current regimen.   Per NP, the patient is alert, oriented x3, ambulating freely in the unit- overnight patient had vaginal clots.   Patient reports worsening pain symptoms- located in the abdomen, not well controlled on current regimen. Pain is 10/10 in severity with no improvement pain level with IV Dilaudid 1mg doses. Patient states that her pain was improved after receiving the 2mg IV Dilaudid pushes the day prior. Palliative follow up requested to assist with pain management.     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 Levine) - patient's states that at the time of diagnosis she was informed that her disease was unresectable based on the site of the tumor - she sought medical opinions at both [facility] and [facility].  RT and chemo were recommended - patient chose to pursue alternative therapies.  Patient presents to ED at the request of Dr. Levine due to uncontrolled pain as an outpatient on Friday January 18, 2019.     Since admitted, patient was placed on MS Contrin 30mg BID initially with IV Dilaudid 2mg q3h PRN breakthrough pain.   Primary team discussed case with Dr. Levine, recommend increasing MS contin to TID and replacing IV Dilaudid with PO Oxycodone as pt seems to respond better to oxycodone: due to suboptimal response to oxycodone patient was placed back on IV Dilaudid 2mg q3h PRN pain. Received total of 5 rescue doses of IV Dilaudid in the past 27 hours.   On 1/20 patient was started on PCA pump: No continuous rate. Demand dose: 1mg, Lockout: 30 minutes, 4 hour limit: 8mg.    Palliative f/u requested as primary team/attending reports uncontrolled pain despite current regimen.   Per NP, the patient is alert, oriented x3, ambulating freely in the unit- overnight patient had vaginal clots.   Patient reports worsening pain symptoms- located in the abdomen, not well controlled on current regimen. Pain is 10/10 in severity with no improvement pain level with IV Dilaudid 1mg doses. Patient states that her pain was improved after receiving the 2mg IV Dilaudid pushes the day prior.  Discussed patient with RN covering, and she reports that the patient has continuous pain despite current regimen 10/10 in severity.     Recommendations:   Will adjust PCA pump Palliative follow up requested to assist with pain management.     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 Levine) - patient's states that at the time of diagnosis she was informed that her disease was unresectable based on the site of the tumor - she sought medical opinions at both [facility] and [facility].  RT and chemo were recommended - patient chose to pursue alternative therapies.  Patient presents to ED at the request of Dr. Levine due to uncontrolled pain as an outpatient on Friday January 18, 2019.     Since admitted, patient was placed on MS Contrin 30mg BID initially with IV Dilaudid 2mg q3h PRN breakthrough pain.   Primary team discussed case with Dr. Levine, recommend increasing MS contin to TID and replacing IV Dilaudid with PO Oxycodone as pt seems to respond better to oxycodone: due to suboptimal response to oxycodone patient was placed back on IV Dilaudid 2mg q3h PRN pain. On 1/19, received total of 5 rescue doses of IV Dilaudid within 27 hours. On 1/20 patient was started on PCA pump: No continuous rate. Demand dose: 1mg, Lockout: 30 minutes, 4 hour limit: 8mg.    Palliative f/u requested as primary team/attending reports uncontrolled pain despite current regimen.   Per NP, the patient is alert, oriented x3, ambulating freely in the unit- overnight patient had vaginal clots.   Patient reports worsening pain symptoms- located in the abdomen, not well controlled on current regimen. Pain is 10/10 in severity with no improvement pain level with IV Dilaudid 1mg doses. Patient states that her pain was improved after receiving the 2mg IV Dilaudid pushes the day prior.  Discussed patient with RN covering, and she reports that the patient has continuous pain despite current regimen 10/10 in severity.   Reviewed PCA pump: received total of 18mg IV Dilaudid (equivalent to 360mg oral Morphine) in span of 12 hours with minimum improvement in pain, tolerated dose well with no reports of increased lethargy or sedation.     Recommendations:   Will adjust PCA pump to the follow settings: No continuous rate. Demand dose: 2mg, Lockout: 30 minutes, 4 hour limit: 16mg.   Will hold off on Clinician Rescue Bolus unless pain remains uncontrolled.   Hold PCA pump if patient is oversedated, or with RR <10 and contact Palliative team.   Ensure bowel regimen, monitor for bowel movements.   Reconsult Palliative urgently if symptoms acutely worsen, refractory to adjusted regimen.   Palliative will continue to follow for symptom management.   Plan discussed with primary and palliative attendings. Palliative follow up requested to assist with pain management.     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 Levine) - patient's states that at the time of diagnosis she was informed that her disease was unresectable based on the site of the tumor - she sought medical opinions at both [facility] and [facility].  RT and chemo were recommended - patient chose to pursue alternative therapies.  Patient presents to ED at the request of Dr. Levine due to uncontrolled pain as an outpatient on Friday January 18, 2019.     Pain regimen since admission:   Since admitted, patient was placed on MS Contrin 30mg BID initially with IV Dilaudid 2mg q3h PRN breakthrough pain.   Primary team discussed case with Dr. Levine, recommend increasing MS contin to TID and replacing IV Dilaudid with PO Oxycodone as pt seems to respond better to oxycodone: due to suboptimal response to oxycodone patient was placed back on IV Dilaudid 2mg q3h PRN pain. On 1/19, received total of 5 rescue doses of IV Dilaudid within 27 hours. On 1/20 patient was started on PCA pump: No continuous rate. Demand dose: 1mg, Lockout: 30 minutes, 4 hour limit: 8mg.    Interval history:   Palliative f/u requested as primary team/attending reports uncontrolled pain despite current regimen.  Per NP, the patient is alert, oriented x3, ambulating freely in the unit- overnight patient had vaginal clots. Patient reports worsening pain symptoms- located in the abdomen, not well controlled on current regimen. Pain is 10/10 in severity with no improvement pain level with IV Dilaudid 1mg doses. Patient states that her pain was improved after receiving the 2mg IV Dilaudid pushes the day prior. Discussed patient with RN covering, and she reports that the patient has continuous pain despite current regimen 10/10 in severity.     Reviewed PCA pump: received total of 18mg IV Dilaudid (equivalent to 360mg oral Morphine) in span of 12 hours with minimum improvement in pain, tolerated dose well with no reports of increased lethargy or sedation.     Recommendations:   Will adjust PCA pump to the follow settings: No continuous rate. Demand dose: 2mg, Lockout: 30 minutes, 4 hour limit: 16mg.   Will hold off on Clinician Rescue Bolus unless pain remains uncontrolled.   Hold PCA pump if patient is oversedated, or with RR <10 and contact Palliative team.   If no sedation-   Ensure bowel regimen, monitor for bowel movements.   Reconsult Palliative urgently if symptoms acutely worsen, refractory to adjusted regimen.   Palliative will continue to follow for symptom management.   Plan discussed with primary and palliative attendings. Palliative follow up requested to assist with pain management.     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 Levine) - patient's states that at the time of diagnosis she was informed that her disease was unresectable based on the site of the tumor - she sought medical opinions at both [facility] and [facility].  RT and chemo were recommended - patient chose to pursue alternative therapies.  Patient presents to ED at the request of Dr. Levine due to uncontrolled pain as an outpatient on Friday January 18, 2019.     Pain regimen since admission:   Since admitted, patient was placed on MS Contrin 30mg BID initially with IV Dilaudid 2mg q3h PRN breakthrough pain.   Primary team discussed case with Dr. Levine, recommend increasing MS contin to TID and replacing IV Dilaudid with PO Oxycodone as pt seems to respond better to oxycodone: due to suboptimal response to oxycodone patient was placed back on IV Dilaudid 2mg q3h PRN pain. On 1/19, received total of 5 rescue doses of IV Dilaudid within 27 hours. On 1/20 patient was started on PCA pump: No continuous rate. Demand dose: 1mg, Lockout: 30 minutes, 4 hour limit: 8mg.    Interval history:   Palliative f/u requested as primary team/attending reports uncontrolled pain despite current regimen.  Per NP, the patient is alert, oriented x3, ambulating freely in the unit- overnight patient had vaginal clots. Patient reports worsening pain symptoms- located in the abdomen, not well controlled on current regimen. Pain is 10/10 in severity with no improvement pain level with IV Dilaudid 1mg doses. Patient states that her pain was improved after receiving the 2mg IV Dilaudid pushes the day prior. Discussed patient with RN covering, and she reports that the patient has continuous pain despite current regimen 10/10 in severity.     Reviewed PCA pump: received total of 18mg IV Dilaudid (equivalent to 360mg oral Morphine) in span of 12 hours with minimum improvement in pain, tolerated dose well with no reports of increased lethargy or sedation.     Recommendations:   Will adjust PCA pump to the follow settings: No continuous rate. Demand dose: 2mg, Lockout: 30 minutes, 4 hour limit: 16mg.   Will hold off on Clinician Rescue Bolus unless pain remains uncontrolled.   Hold PCA pump if patient is oversedated, or with RR <10 and contact Palliative team.   If no increased sedation in next 24 hours- consider uptitrating Gabapentin to 300 TID, or switching to Pregabalin.  May need to consider an alternative to MS Garcia given her total daily requirements exceed >300mg oral Morphine.   Evaluate option of palliative radiation for pain control if it aligns with patient's goals.    Ensure bowel regimen, monitor for bowel movements.   Reconsult Palliative urgently if symptoms acutely worsen, refractory to adjusted regimen.   Palliative will continue to follow for symptom management.   Plan discussed with primary and palliative attendings.

## 2019-01-21 NOTE — DIETITIAN INITIAL EVALUATION ADULT. - PROBLEM SELECTOR PLAN 3
- prescribed clonazepam 2mg BID - per patient takes it when needed however has been taking it more at night to help with sleep  - will c/w 2mg BID prn while admitted  - last script sent 1/7 and filled 1/17  Istop reference #[id number]

## 2019-01-21 NOTE — PROGRESS NOTE ADULT - PROBLEM SELECTOR PLAN 3
- prescribed clonazepam 2mg BID - per patient takes it when needed however has been taking it more at night to help with sleep  - will c/w 2mg BID prn while admitted  - last script sent 1/7 and filled 1/17  Istop reference #[id number]

## 2019-01-21 NOTE — DIETITIAN INITIAL EVALUATION ADULT. - NS FNS SUPPLEMENTS
240ml 2x/d of Ensure Enlive (700 kcal w/40gm protein); Ensure Pudding 2x/d (340kcal w/8gm protein)/Ensure pudding®/Ensure Enlive®

## 2019-01-21 NOTE — DIETITIAN INITIAL EVALUATION ADULT. - PROBLEM SELECTOR PLAN 2
- MRI in 11/2018 confirming progression of disease  - patient not interested in chemotherapy, has been only pursuing alternative measures however currently requesting rad/onc eval - per patient and friend at bedside she recently saw a radiation oncologist outside of [facility] who recommended radiation  - will discuss in AM with patient separately as it seems during interview her wishes may not align with friend at bedside  - hospice referral made 12/19/18 however patient has not yet had a consultation

## 2019-01-21 NOTE — DIETITIAN INITIAL EVALUATION ADULT. - PROBLEM SELECTOR PLAN 1
- increased pain not controlled with PO morphine ER and PO hydromorphone q4 outpatient  - Palliative consulted - c/w morphine 30 BID as seems patient had not been taking regularly, started on dilaudid IV 1mg q4 prn for now - currently controlled, will uptirate as needed, will continue home neurontin dose as well - 300 BID  - follows with Dr Abe Thompson outpatient  - c/w home bowel regimen - senna and lactulose  - Istop reference #[id number]

## 2019-01-21 NOTE — PROGRESS NOTE ADULT - SUBJECTIVE AND OBJECTIVE BOX
Patient is a 68y old  Female who presents with a chief complaint of uncontrolled pain (20 Jan 2019 13:51)      SUBJECTIVE / OVERNIGHT EVENTS: Pt reports poorly controlled pain despite being transitioned to PCA pump - pt was not pressing pump regularly.  Pt is standing up, brushing teeth, but reports 10/10 pain.  Demanding that pump be readjusted to 2mg demand dose.       MEDICATIONS  (STANDING):  gabapentin 300 milliGRAM(s) Oral two times a day  HYDROmorphone PCA (5 mG/mL) 30 milliLiter(s) PCA Continuous PCA Continuous  lactulose Syrup 10 Gram(s) Oral two times a day  morphine ER Tablet 30 milliGRAM(s) Oral three times a day  polyethylene glycol 3350 17 Gram(s) Oral two times a day  senna 2 Tablet(s) Oral at bedtime  sodium chloride 0.9%. 1000 milliLiter(s) (30 mL/Hr) IV Continuous <Continuous>    MEDICATIONS  (PRN):  artificial tears (preservative free) Ophthalmic Solution 1 Drop(s) Both EYES two times a day PRN Dry Eyes  bisacodyl 5 milliGRAM(s) Oral daily PRN Constipation  clonazePAM Tablet 2 milliGRAM(s) Oral two times a day PRN anxiety  naloxone Injectable 0.1 milliGRAM(s) IV Push every 3 minutes PRN For ANY of the following changes in patient status:  A. RR LESS THAN 10 breaths per minute, B. Oxygen saturation LESS THAN 90%, C. Sedation score of 6  ondansetron Injectable 4 milliGRAM(s) IV Push every 6 hours PRN Nausea      Vital Signs Last 24 Hrs  T(C): 36.7 (21 Jan 2019 12:51), Max: 36.8 (21 Jan 2019 07:30)  T(F): 98.1 (21 Jan 2019 12:51), Max: 98.2 (21 Jan 2019 07:30)  HR: 74 (21 Jan 2019 12:51) (74 - 87)  BP: 155/80 (21 Jan 2019 12:51) (101/52 - 155/80)  BP(mean): --  RR: 18 (21 Jan 2019 12:51) (17 - 19)  SpO2: 100% (21 Jan 2019 12:51) (95% - 100%)  CAPILLARY BLOOD GLUCOSE      PHYSICAL EXAM  GENERAL: NAD, well-developed  HEAD:  Atraumatic, Normocephalic  EYES: EOMI, PERRLA, conjunctiva and sclera clear  NECK: Supple, No JVD  CHEST/LUNG: Clear to auscultation bilaterally; No wheeze  HEART: Regular rate and rhythm; No murmurs, rubs, or gallops  ABDOMEN: Soft, +tender in b/l LQ, mildly distended; Bowel sounds present  EXTREMITIES:  2+ Peripheral Pulses, No clubbing, cyanosis, or edema  PSYCH: AAOx3  SKIN: No rashes or lesions    LABS:                        9.4    7.24  )-----------( 417      ( 21 Jan 2019 06:25 )             31.4     01-21    136  |  96<L>  |  9   ----------------------------<  96  4.2   |  26  |  0.72    Ca    9.3      21 Jan 2019 06:25      Consultant(s) Notes Reviewed:  Palliative     Care Discussed with Consultants/Other Providers: Palliative Dr. Alcantara

## 2019-01-21 NOTE — DIETITIAN INITIAL EVALUATION ADULT. - OTHER INFO
Pt referred for nutrition consult 2/2 unintentional weight loss greater than 10%.  Pt admitted with dx of chronic pain.  Pt  c/o pain and constipation at time of visit - limited history obtained.  Spoke w/RN.  Pt started on lactulose this AM for constipation.  Pt states she only wants jello at this time, and may sometimes take a fruit cup.  She had some pudding last night.  Stated she disliked meat and mashed potatoes last night.  Reviewed alternate items available, but pt was not interested.  She states she was following a vegan diet PTA, but is willing to eat anything that she can tolerate now.  Offered pt Ensure.  She was concerned that "sugar feeds cancer, " but is willing to try it.  Pt was on MVI at home.  Noted pt was pursuing alternate treatments to cancer via [facility].

## 2019-01-22 DIAGNOSIS — K59.00 CONSTIPATION, UNSPECIFIED: ICD-10-CM

## 2019-01-22 LAB
ANION GAP SERPL CALC-SCNC: 11 MMO/L — SIGNIFICANT CHANGE UP (ref 7–14)
BASOPHILS # BLD AUTO: 0.06 K/UL — SIGNIFICANT CHANGE UP (ref 0–0.2)
BASOPHILS NFR BLD AUTO: 0.8 % — SIGNIFICANT CHANGE UP (ref 0–2)
BUN SERPL-MCNC: 6 MG/DL — LOW (ref 7–23)
CALCIUM SERPL-MCNC: 9.5 MG/DL — SIGNIFICANT CHANGE UP (ref 8.4–10.5)
CHLORIDE SERPL-SCNC: 97 MMOL/L — LOW (ref 98–107)
CO2 SERPL-SCNC: 29 MMOL/L — SIGNIFICANT CHANGE UP (ref 22–31)
CREAT SERPL-MCNC: 0.64 MG/DL — SIGNIFICANT CHANGE UP (ref 0.5–1.3)
EOSINOPHIL # BLD AUTO: 0.29 K/UL — SIGNIFICANT CHANGE UP (ref 0–0.5)
EOSINOPHIL NFR BLD AUTO: 3.9 % — SIGNIFICANT CHANGE UP (ref 0–6)
GLUCOSE SERPL-MCNC: 104 MG/DL — HIGH (ref 70–99)
HCT VFR BLD CALC: 30.3 % — LOW (ref 34.5–45)
HGB BLD-MCNC: 9.2 G/DL — LOW (ref 11.5–15.5)
IMM GRANULOCYTES NFR BLD AUTO: 0.4 % — SIGNIFICANT CHANGE UP (ref 0–1.5)
LYMPHOCYTES # BLD AUTO: 1.44 K/UL — SIGNIFICANT CHANGE UP (ref 1–3.3)
LYMPHOCYTES # BLD AUTO: 19.5 % — SIGNIFICANT CHANGE UP (ref 13–44)
MAGNESIUM SERPL-MCNC: 1.8 MG/DL — SIGNIFICANT CHANGE UP (ref 1.6–2.6)
MCHC RBC-ENTMCNC: 24.9 PG — LOW (ref 27–34)
MCHC RBC-ENTMCNC: 30.4 % — LOW (ref 32–36)
MCV RBC AUTO: 82.1 FL — SIGNIFICANT CHANGE UP (ref 80–100)
MONOCYTES # BLD AUTO: 0.56 K/UL — SIGNIFICANT CHANGE UP (ref 0–0.9)
MONOCYTES NFR BLD AUTO: 7.6 % — SIGNIFICANT CHANGE UP (ref 2–14)
NEUTROPHILS # BLD AUTO: 5.02 K/UL — SIGNIFICANT CHANGE UP (ref 1.8–7.4)
NEUTROPHILS NFR BLD AUTO: 67.8 % — SIGNIFICANT CHANGE UP (ref 43–77)
NRBC # FLD: 0 K/UL — LOW (ref 25–125)
PHOSPHATE SERPL-MCNC: 4 MG/DL — SIGNIFICANT CHANGE UP (ref 2.5–4.5)
PLATELET # BLD AUTO: 379 K/UL — SIGNIFICANT CHANGE UP (ref 150–400)
PMV BLD: 8.6 FL — SIGNIFICANT CHANGE UP (ref 7–13)
POTASSIUM SERPL-MCNC: 4.5 MMOL/L — SIGNIFICANT CHANGE UP (ref 3.5–5.3)
POTASSIUM SERPL-SCNC: 4.5 MMOL/L — SIGNIFICANT CHANGE UP (ref 3.5–5.3)
RBC # BLD: 3.69 M/UL — LOW (ref 3.8–5.2)
RBC # FLD: 14.6 % — HIGH (ref 10.3–14.5)
SODIUM SERPL-SCNC: 137 MMOL/L — SIGNIFICANT CHANGE UP (ref 135–145)
WBC # BLD: 7.4 K/UL — SIGNIFICANT CHANGE UP (ref 3.8–10.5)
WBC # FLD AUTO: 7.4 K/UL — SIGNIFICANT CHANGE UP (ref 3.8–10.5)

## 2019-01-22 PROCEDURE — 99233 SBSQ HOSP IP/OBS HIGH 50: CPT

## 2019-01-22 PROCEDURE — 99222 1ST HOSP IP/OBS MODERATE 55: CPT

## 2019-01-22 PROCEDURE — 99223 1ST HOSP IP/OBS HIGH 75: CPT | Mod: GC

## 2019-01-22 PROCEDURE — 77262 THER RADIOLOGY TX PLNG INTRM: CPT

## 2019-01-22 RX ORDER — LACTULOSE 10 G/15ML
10 SOLUTION ORAL EVERY 6 HOURS
Qty: 0 | Refills: 0 | Status: DISCONTINUED | OUTPATIENT
Start: 2019-01-22 | End: 2019-01-31

## 2019-01-22 RX ORDER — GABAPENTIN 400 MG/1
1200 CAPSULE ORAL AT BEDTIME
Qty: 0 | Refills: 0 | Status: DISCONTINUED | OUTPATIENT
Start: 2019-01-22 | End: 2019-01-31

## 2019-01-22 RX ORDER — HYDROMORPHONE HYDROCHLORIDE 2 MG/ML
30 INJECTION INTRAMUSCULAR; INTRAVENOUS; SUBCUTANEOUS
Qty: 0 | Refills: 0 | Status: DISCONTINUED | OUTPATIENT
Start: 2019-01-22 | End: 2019-01-26

## 2019-01-22 RX ORDER — LACTULOSE 10 G/15ML
20 SOLUTION ORAL
Qty: 0 | Refills: 0 | Status: DISCONTINUED | OUTPATIENT
Start: 2019-01-22 | End: 2019-01-22

## 2019-01-22 RX ORDER — GABAPENTIN 400 MG/1
300 CAPSULE ORAL THREE TIMES A DAY
Qty: 0 | Refills: 0 | Status: DISCONTINUED | OUTPATIENT
Start: 2019-01-22 | End: 2019-01-22

## 2019-01-22 RX ADMIN — MORPHINE SULFATE 30 MILLIGRAM(S): 50 CAPSULE, EXTENDED RELEASE ORAL at 06:26

## 2019-01-22 RX ADMIN — GABAPENTIN 1200 MILLIGRAM(S): 400 CAPSULE ORAL at 21:52

## 2019-01-22 RX ADMIN — SODIUM CHLORIDE 30 MILLILITER(S): 9 INJECTION INTRAMUSCULAR; INTRAVENOUS; SUBCUTANEOUS at 07:52

## 2019-01-22 RX ADMIN — HYDROMORPHONE HYDROCHLORIDE 30 MILLILITER(S): 2 INJECTION INTRAMUSCULAR; INTRAVENOUS; SUBCUTANEOUS at 07:52

## 2019-01-22 RX ADMIN — LACTULOSE 10 GRAM(S): 10 SOLUTION ORAL at 06:26

## 2019-01-22 RX ADMIN — Medication 2 MILLIGRAM(S): at 22:35

## 2019-01-22 RX ADMIN — POLYETHYLENE GLYCOL 3350 17 GRAM(S): 17 POWDER, FOR SOLUTION ORAL at 18:50

## 2019-01-22 RX ADMIN — HYDROMORPHONE HYDROCHLORIDE 30 MILLILITER(S): 2 INJECTION INTRAMUSCULAR; INTRAVENOUS; SUBCUTANEOUS at 13:19

## 2019-01-22 RX ADMIN — Medication 10 MILLIGRAM(S): at 21:52

## 2019-01-22 RX ADMIN — Medication 2 MILLIGRAM(S): at 00:57

## 2019-01-22 RX ADMIN — GABAPENTIN 300 MILLIGRAM(S): 400 CAPSULE ORAL at 06:26

## 2019-01-22 RX ADMIN — HYDROMORPHONE HYDROCHLORIDE 30 MILLILITER(S): 2 INJECTION INTRAMUSCULAR; INTRAVENOUS; SUBCUTANEOUS at 19:54

## 2019-01-22 RX ADMIN — LACTULOSE 10 GRAM(S): 10 SOLUTION ORAL at 18:50

## 2019-01-22 RX ADMIN — ONDANSETRON 4 MILLIGRAM(S): 8 TABLET, FILM COATED ORAL at 22:39

## 2019-01-22 RX ADMIN — SODIUM CHLORIDE 30 MILLILITER(S): 9 INJECTION INTRAMUSCULAR; INTRAVENOUS; SUBCUTANEOUS at 21:53

## 2019-01-22 RX ADMIN — POLYETHYLENE GLYCOL 3350 17 GRAM(S): 17 POWDER, FOR SOLUTION ORAL at 06:27

## 2019-01-22 NOTE — PROGRESS NOTE ADULT - PROBLEM SELECTOR PLAN 2
-not resectable, never had chemo/RT  -case d/w rad/onc Dr. Huynh, plan for palliative RT x5 sessions, simulation tomorrow  -oncology consult (emailed)  - MRI in 11/2018 confirming progression of disease  - patient not interested in chemotherapy, has been only pursuing alternative measures however currently requesting rad/onc eval (will need to call Tuesday)  - pt would like [place] referral, d/w SW -not resectable, never had chemo/RT, pt was seen by obgyn Dr. Rianna Gray in past  -case d/w rad/onc Dr. Huynh, plan for palliative RT x5 sessions, simulation tomorrow  -oncology consult as pt states that she maybe interested in immunotherapy (emailed)  - MRI in 11/2018 confirming progression of disease  - patient not interested in chemotherapy, has been only pursuing alternative measures however currently requesting rad/onc eval (will need to call Tuesday)  - pt would like [facility], d/w SW -not resectable, never had chemo/RT, pt was seen by obgyn Dr. Rianna Gray in past  -case d/w rad/onc Dr. Huynh, plan for palliative RT x5 sessions, simulation tomorrow  -oncology consult as pt states that she maybe interested in immunotherapy (emailed)  - MRI in 11/2018 confirming progression of disease  - patient not interested in chemotherapy, has been only pursuing alternative measures   - pt would like [facility], d/w SW

## 2019-01-22 NOTE — PROGRESS NOTE ADULT - PROBLEM SELECTOR PLAN 1
- increased pain not controlled with PO morphine ER and PO hydromorphone q4 outpatient, pt remains with suboptimally controlled pain despite IV 2mg Dilaudid q3 hours.   -palliative care f/u appreciated, changed PCA to  continuous 1mg/hr, demand 2mg IV q30min PRN.   - Pt has tried methadone in the past and didn't like the side effects of nausea   -c/w bowel regimen, increase lactulose, had small  BM today  - Outpt palliative MD Dr. Levine also aware of hospital course

## 2019-01-22 NOTE — PROGRESS NOTE ADULT - PROBLEM SELECTOR PLAN 3
Chronic for patient.  She states she takes lactulose 4 times a day at home - ordered here - also on Senna, miralax and dulcolax (takes a baseline).

## 2019-01-22 NOTE — PROGRESS NOTE ADULT - SUBJECTIVE AND OBJECTIVE BOX
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 Huynh this am about palliative RT    MEDICATIONS  (STANDING):  bisacodyl 10 milliGRAM(s) Oral at bedtime  gabapentin 1200 milliGRAM(s) Oral at bedtime  HYDROmorphone PCA (5 mG/mL) 30 milliLiter(s) PCA Continuous PCA Continuous  lactulose Syrup 10 Gram(s) Oral every 6 hours  polyethylene glycol 3350 17 Gram(s) Oral two times a day  sodium chloride 0.9%. 1000 milliLiter(s) (30 mL/Hr) IV Continuous <Continuous>    MEDICATIONS  (PRN):  artificial tears (preservative free) Ophthalmic Solution 1 Drop(s) Both EYES two times a day PRN Dry Eyes  clonazePAM Tablet 2 milliGRAM(s) Oral two times a day PRN anxiety  naloxone Injectable 0.1 milliGRAM(s) IV Push every 3 minutes PRN For ANY of the following changes in patient status:  A. RR LESS THAN 10 breaths per minute, B. Oxygen saturation LESS THAN 90%, C. Sedation score of 6  ondansetron Injectable 4 milliGRAM(s) IV Push every 6 hours PRN Nausea      VITALS:  T(F): 98 (01-22-19 @ 12:57), Max: 99.3 (01-21-19 @ 19:21)  HR: 92 (01-22-19 @ 12:57) (81 - 92)  BP: 129/81 (01-22-19 @ 12:57) (116/58 - 129/81)  RR: 18 (01-22-19 @ 12:57) (17 - 18)  SpO2: 100% (01-22-19 @ 12:57)      CAPILLARY BLOOD GLUCOSE    Output     I&O's Summary  T(F): 98 (01-22-19 @ 12:57), Max: 99.3 (01-21-19 @ 19:21)  HR: 92 (01-22-19 @ 12:57) (81 - 92)  BP: 129/81 (01-22-19 @ 12:57) (116/58 - 129/81)  RR: 18 (01-22-19 @ 12:57) (17 - 18)  SpO2: 100% (01-22-19 @ 12:57)    PHYSICAL EXAM:  GENERAL: NAD, well-developed  HEAD:  Atraumatic, Normocephalic  EYES: EOMI, PERRLA, conjunctiva and sclera clear  NECK: Supple, No JVD  CHEST/LUNG: Clear to auscultation bilaterally; No wheeze  HEART: Regular rate and rhythm; No murmurs, rubs, or gallops  ABDOMEN: Soft, +tender in b/l LQ, mildly distended; Bowel sounds present  EXTREMITIES:  2+ Peripheral Pulses, No clubbing, cyanosis, or edema  PSYCH: AAOx3  SKIN: No rashes or lesions    LABS:              9.2                  137  | 29   | 6            7.40  >-----------< 379     ------------------------< 104                   30.3                 4.5  | 97   | 0.64                                         Ca 9.5   Mg 1.8   Ph 4.0        MICROBIOLOGY:    RADIOLOGY & ADDITIONAL TESTS:    Imaging Personally Reviewed:    [ ] Consultant(s) Notes Reviewed:  [ x] Care Discussed with Consultants/Other Providers: rad/onc Dr. Huynh for palliative RT

## 2019-01-22 NOTE — PROGRESS NOTE ADULT - PROBLEM SELECTOR PLAN 2
Plan for 5 sessions of palliative RT - completes 1/30/19 - Patient states she does not want further disease modifying treatment and would like hospice.  Requesting a [facility] referral - social work aware.  Continue supportive care.

## 2019-01-22 NOTE — PROGRESS NOTE ADULT - SUBJECTIVE AND OBJECTIVE BOX
INTERVAL HPI/OVERNIGHT EVENTS: Uncontrolled pelvic/low abdominal pain     Code Status: DNR  Allergies    No Known Allergies    Intolerances    MEDICATIONS  (STANDING):  bisacodyl 10 milliGRAM(s) Oral at bedtime  gabapentin 1200 milliGRAM(s) Oral at bedtime  HYDROmorphone PCA (5 mG/mL) 30 milliLiter(s) PCA Continuous PCA Continuous  lactulose Syrup 10 Gram(s) Oral every 6 hours  polyethylene glycol 3350 17 Gram(s) Oral two times a day  sodium chloride 0.9%. 1000 milliLiter(s) (30 mL/Hr) IV Continuous <Continuous>    MEDICATIONS  (PRN):  artificial tears (preservative free) Ophthalmic Solution 1 Drop(s) Both EYES two times a day PRN Dry Eyes  clonazePAM Tablet 2 milliGRAM(s) Oral two times a day PRN anxiety  naloxone Injectable 0.1 milliGRAM(s) IV Push every 3 minutes PRN For ANY of the following changes in patient status:  A. RR LESS THAN 10 breaths per minute, B. Oxygen saturation LESS THAN 90%, C. Sedation score of 6  ondansetron Injectable 4 milliGRAM(s) IV Push every 6 hours PRN Nausea      PRESENT SYMPTOMS: [ ]Unable to obtain due to poor mentation   Source if other than patient:  [ ]Family   [ ]Team     Pain (Impact on QOL):  Patient reports pelvic/low abdominal pain 8/10 - sharp - constant - aggravated by movement - mildly relieved with opioids  Dyspnea:  Yes [ ] No [x ] - [ ]Mild [ ]Moderate [ ]Severe  Anxiety:    Yes [ x] No [ ] - [x ]Mild [ ]Moderate [ ]Severe  Fatigue:    Yes [x ] No [ ] - [ ]Mild [x ]Moderate [ ]Severe  Nausea:    Yes [ ] No [x ] - [ ]Mild [ ]Moderate [ ]Severe                         Loss of appetite: Yes [x ] No [ ] - [ ]Mild [x ]Moderate [ ]Severe             Constipation:  Yes [x ]     PAIN AD Score:	  http://geriatrictoolkit.[employer].edu/cog/painad.pdf (Ctrl + left click to view)    Other Symptoms:  [x ]All other review of systems negative     Karnofsky Performance Score/Palliative Performance Status Version 2:    50-60%    http://palliative.info/resource_material/[email or web address].pdf    PHYSICAL EXAM:  Vital Signs Last 24 Hrs  T(C): 36.7 (22 Jan 2019 12:57), Max: 37.4 (21 Jan 2019 19:21)  T(F): 98 (22 Jan 2019 12:57), Max: 99.3 (21 Jan 2019 19:21)  HR: 92 (22 Jan 2019 12:57) (81 - 92)  BP: 129/81 (22 Jan 2019 12:57) (116/58 - 129/81)  BP(mean): --  RR: 18 (22 Jan 2019 12:57) (17 - 18)  SpO2: 100% (22 Jan 2019 12:57) (95% - 100%) I&O's Summary     GENERAL:  [x ]Alert  [x]Oriented x 4  [ ]Lethargic  [ ]Cachexia  [ ]Unarousable  [x ]Verbal  [ ]Non-Verbal  PULMONARY:   [x ]Clear - anteriorly    [ ]Rhonchi        [ ]Right [ ]Left [ ]Bilateral  [ ]Crackles        [ ]Right [ ]Left [ ]Bilateral  [ ]Wheezing     [ ]Right [ ]Left [ ]Bilateral  CARDIOVASCULAR:    [x ]Regular [ ]Irregular [ ]Tachy  [ ]Dex [ ]Murmur [ ]Other  GASTROINTESTINAL:  [x ]Soft  [ ]Distended   [ x]+BS  [x ]Non tender [ ]Tender  [ ]PEG [ ]OGT/ NGT   Last BM:  1/20/19  GENITOURINARY:  [ x]Normal [ ] Incontinent   [ ]Oliguria/Anuria   [ ]Otto  MUSCULOSKELETAL:   [ ]Normal   [ x]Weakness  [ ]Bed/Wheelchair bound [ ]Edema  NEUROLOGIC:   [x ]No focal deficits  [ ] Cognitive impairment  [ ] Dysphagia [ ]Dysarthria [ ] Paresis [ ]Other   SKIN:   [ x] normal   [ ]Pressure ulcer(s)  [ ]Rash    CRITICAL CARE:  [ ] Shock Present  [ ]Septic [ ]Cardiogenic [ ]Neurologic [ ]Hypovolemic  [ ]  Vasopressors [ ]  Inotropes   [ ] Respiratory failure present  [ ] Acute  [ ] Chronic [ ] Hypoxic  [ ] Hypercarbic [ ] Other  [ ] Other organ failure     LABS:                        9.2    7.40  )-----------( 379      ( 22 Jan 2019 06:56 )             30.3   01-22    137  |  97<L>  |  6<L>  ----------------------------<  104<H>  4.5   |  29  |  0.64    Ca    9.5      22 Jan 2019 06:56  Phos  4.0     01-22  Mg     1.8     01-22          RADIOLOGY & ADDITIONAL STUDIES:    Protein Calorie Malnutrition Present: [ ] yes [ ] no  [ ] PPSV2 < or = 30%  [ ] significant weight loss [ ] poor nutritional intake [ ] anasarca [ ] catabolic state Albumin, Serum: 4.0 g/dL (01-18-19 @ 17:53)      REFERRALS:   [ ]Chaplaincy  [ ] Hospice  [ ]Child Life  [ ]Social Work  [ ]Case management [ ]Holistic Therapy   Goals of Care Document:

## 2019-01-22 NOTE — CONSULT NOTE ADULT - ATTENDING COMMENTS
Pt with diagnosis of cervical adeno ca, refused chemo RT in the past, now admitted with worsening of disease. Oncology consult called in to discuss the role of immunotherapy. We discussed that immunotherapy can be used for PDL1 positive tumor and we discussed immunotherapy side effects. We offered to check the path from 2017 for PDL1 testing. Patient was not interested and didn't want us to send the tissue yet. We will set up outpt f/u with med onc after RT is done.

## 2019-01-22 NOTE — PROGRESS NOTE ADULT - PROBLEM SELECTOR PLAN 1
Patient reports unrelieved pain despite PCA increase yesterday.  Of note, patient received 40mg IV Dilaudid in addition to MS Contin 30mg PO TID - Discussed at length with patient.  Patient declines Methadone as it has made her nauseous in the past.  The goal would be to be discharged with the PCA due to her high requirements - either managed by Dr. Levine or hospice - depending on dispo plan.  MS Contin discontinued - Continuous added to patient's PCA - Dilaudid 5mg/ml - continuous 1mg/hr, demand 2mg IV q30min PRN.  Educated on appropriate use of PCA - verbalized understanding.  Patient states her home dose of Neurontin is 1200mg PO QHS - ordered.  Bowel regimen. Patient reports unrelieved pain despite PCA increase yesterday.  Of note, patient received 40mg IV Dilaudid in addition to MS Contin 30mg PO TID - Discussed at length with patient.  Patient declines Methadone as it has made her nauseous in the past.  The goal would be to be discharged with the PCA due to her high requirements - either managed by Dr. Levine or hospice - depending on dispo plan.  MS Contin discontinued - Continuous added to patient's PCA - Dilaudid 5mg/ml - continuous 1mg/hr, demand 2mg IV q30min PRN.  Educated on appropriate use of PCA - verbalized understanding.  Patient states her home dose of Neurontin is 1200mg PO QHS - ordered.  Patient states that she would sacrifice her alertness to control her pain.  Goal at this time is to control her pain as much as possible while allowing her to be able to carry our her ADL's.  Bowel regimen.

## 2019-01-22 NOTE — CONSULT NOTE ADULT - SUBJECTIVE AND OBJECTIVE BOX
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 She has no prior h/o radiation treatment.  Patient follows with Dr Abe Thompson outpatient and was recently increased to MS ER 30mg BID and transitioned from hydrocodone to hydromorphone 2mg q3-4 prn on 1/15/19. She states more recently the pain has been constant, sharp during the day 9.5/10 in her lower abdomen. She previously would only get pain at night. She also reports some nausea, no vomiting, and does c/o constipation x 1 week now. Denies any blood in stool or urine.  She does c/o pelvic pain that radiates towards her back now. Denies pelvic bleeding now, no vaginal bleeding at this time but gives a previous h/o vaginal bleeding off/on since September of 2018.      Patient had an MRI done 11/5/18 which confirmed progression of disease. Patient follows with Dr Abe Thompson for pain management.  This MRI result is in [employer].     History wise, patient followed with gyn/onc in June 2017 who recommended concurrent chemo/RT as opposed to radical hysterectomy. Patient did not want chemotherapy however agreed to radiation and rad/onc referral was made. Patient did not follow up however had all [place] [place] Patient then decided to pursue alternative treatments with a holistic doctor.       KPS 60-70.  seen resting in bed but easily able to get OOB to chair, bathroom, and is fully ambulatory.     We came to assess her for consultation and consideration of a palliative course of radiation for her pelvic pain.       No family history pertinent to patient’s current condition/encounter (18 Jan 2019 19:18)      Allergies    No Known Allergies    Intolerances        ROS: [  ] Fever  [  ] Chills  [  ]Chest Pain [  ] SOB  [  ]Cough [  ] N/V  [  ] Diarrhea [  ]Constipation [  ]Other ROS:  [  ] ROS otherwise negative    PAST MEDICAL & SURGICAL HISTORY:  Endocervical adenocarcinoma  Endometrial carcinoma  Subdural Haemorrhage, Nontraumatic: 9/2010 after being started on coumadin, patient denied surgical intervention resolved on own.  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, issue with short term memory recall  OA (Osteoarthritis): hip  c spine herniations  Lumbar Disc herniations   l4 l5: since 1981  Depression: treated since  1992  Mitral Valve Regurgitation: echo 2008  Hyperlipidemia: no meds  Chronic Pain  S/P Hip Replacement: Right total hip replacement  9/2010  Plastic Surgery: to face due to facial bite  S/P Tonsillectomy: age 7      FAMILY HISTORY:  No pertinent family history in first degree relatives      MEDICATIONS  (STANDING):  bisacodyl 10 milliGRAM(s) Oral at bedtime  gabapentin 300 milliGRAM(s) Oral three times a day  HYDROmorphone PCA (5 mG/mL) 30 milliLiter(s) PCA Continuous PCA Continuous  lactulose Syrup 20 Gram(s) Oral two times a day  morphine ER Tablet 30 milliGRAM(s) Oral three times a day  polyethylene glycol 3350 17 Gram(s) Oral two times a day  sodium chloride 0.9%. 1000 milliLiter(s) (30 mL/Hr) IV Continuous <Continuous>    MEDICATIONS  (PRN):  artificial tears (preservative free) Ophthalmic Solution 1 Drop(s) Both EYES two times a day PRN Dry Eyes  clonazePAM Tablet 2 milliGRAM(s) Oral two times a day PRN anxiety  naloxone Injectable 0.1 milliGRAM(s) IV Push every 3 minutes PRN For ANY of the following changes in patient status:  A. RR LESS THAN 10 breaths per minute, B. Oxygen saturation LESS THAN 90%, C. Sedation score of 6  ondansetron Injectable 4 milliGRAM(s) IV Push every 6 hours PRN Nausea      PHYSICAL EXAM  KPS 60.  Vital Signs Last 24 Hrs  T(C): 36.4 (21 Jan 2019 23:21), Max: 37.4 (21 Jan 2019 19:21)  T(F): 97.5 (21 Jan 2019 23:21), Max: 99.3 (21 Jan 2019 19:21)  HR: 81 (22 Jan 2019 06:23) (74 - 91)  BP: 116/58 (22 Jan 2019 06:23) (116/58 - 155/80)  BP(mean): --  RR: 17 (22 Jan 2019 06:23) (17 - 19)  SpO2: 100% (22 Jan 2019 06:23) (95% - 100%)    General: Well nourished, well developed, no acute distress  HEENT: NC/AT; EOMI, PERRL, sclera nonicteric; external ears normal; no rhinorrhea or epistaxis; mucous membranes moist; oropharynx clear and without erythema  CV: NR, RR; no appreciable r/m/g  Lungs: CTAB, no increased work of breathing  Abdomen: Bowel sounds present; soft, NTND  MSK:  mild lower back pain tenderness, able to straight leg raise b/l.   Neuro: AAOx3; cranial nerves II-XII intact; strength 5/5 in upper and lower extremities; sensation to light touch in tact bilaterally.  Psych: Full affect; mood congruent  Skin: no visible rashes on limited examination        ASSESSMENT/PLAN    PARTHA MALIK is a 68y woman with endometrial cervical cancer, no previous surgeries, chemotherapy, nor radiation treatment for this cancer.  She tried alternative therapies and her cancer has progressed to the point that palliative care pain medicines   are no longer worker and she is now hospitalized having intractable lower back pain and pelvic pain.     We discussed the use of palliative radiation in this setting, namely to improve quality of life through the reduction of symptoms.  We talked about the risks, benefits, acute and long term side effects, as well as expected treatment outcomes.  She was given the opportunity to ask questions, which were answered to his/her apparent satisfaction.  She provided written consent to proceed with radiation therapy. We will arrange for inpatient/outpatient treatment and consider a short palliative course of 5 fractions if she agrees.     [phone]. 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 She has no prior h/o radiation treatment, has undergone various holistic treatments.  Patient follows with Dr Abe Thompson outpatient and was recently increased to MS ER 30mg BID and transitioned from hydrocodone to hydromorphone 2mg q3-4 prn on 1/15/19. She states more recently the pain has been constant, sharp during the day 9.5/10 in her lower abdomen. She previously would only get pain at night. She also reports some nausea, no vomiting, and does c/o constipation x 1 week now. Denies any blood in stool or urine.  She does c/o pelvic pain that radiates towards her back now,8-9/10, only partially relieved with meds. Denies pelvic bleeding now, no vaginal bleeding at this time but gives a previous h/o vaginal bleeding off/on since September of 2018.      Patient had an MRI done 11/5/18 which confirmed progression of disease. Patient follows with Dr Abe Thompson for pain management.  This MRI result is in [employer]. PET scan at [facility] 12/7/18 shows markedly abnormal uptake throughout the uterus,multiple areas of lymphadenopathy in the retroperitoneal /faviola-aortic regions, hypermetabolic soft tissue nodule abutting the bowel in the RLQ, questionable sub-cm. lung nodules. Pelvic MRI 11/5/18 shows increase in size of cervical tumor competed to 6/9/18, now 9.6 cm in greatest dimension., no rectal, vaginal or bladder involvement.  Enlarged RP nodes, the largest 2.8 cm.,, additional progressive pelvic adenopathy noted as well. Tumor noted in the rectouterine pouch.  Of note, the  patient followed with gyn/onc in June 2017 who recommended concurrent chemo/RT as opposed to radical hysterectomy. Patient did not want chemotherapy however agreed to radiation and rad/onc referral was made. Patient did not follow up however had all records transferred to [facility] [place] opinion. Patient then decided to pursue alternative treatments with a holistic doctor.       KPS 60-70.  seen resting in bed but easily able to get OOB to chair, bathroom, and is fully ambulatory.     We came to assess her for consultation and consideration of a palliative course of radiation for her pelvic pain.     No family history pertinent to patient’s current condition/encounter (18 Jan 2019 19:18)    No Known Allergies    Intolerances  ROS: [  ] Fever  [  ] Chills  [  ]Chest Pain [  ] SOB  [  ]Cough [  ] N/V  [  ] Diarrhea [  ]Constipation [  ]Other ROS:  [  ] ROS otherwise negative    PAST MEDICAL & SURGICAL HISTORY:  Endocervical adenocarcinoma  Endometrial carcinoma  Subdural Haemorrhage, Nontraumatic: 9/2010 after being started on coumadin, patient denied surgical intervention resolved on own.  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, issue with short term memory recall  OA (Osteoarthritis): hip  c spine herniations  Lumbar Disc herniations   l4 l5: since 1981  Depression: treated since  1992  Mitral Valve Regurgitation: echo 2008  Hyperlipidemia: no meds  Chronic Pain  S/P Hip Replacement: Right total hip replacement  9/2010  Plastic Surgery: to face due to facial bite  S/P Tonsillectomy: age 7      FAMILY HISTORY:  No pertinent family history in first degree relatives      MEDICATIONS  (STANDING):  bisacodyl 10 milliGRAM(s) Oral at bedtime  gabapentin 300 milliGRAM(s) Oral three times a day  HYDROmorphone PCA (5 mG/mL) 30 milliLiter(s) PCA Continuous PCA Continuous  lactulose Syrup 20 Gram(s) Oral two times a day  morphine ER Tablet 30 milliGRAM(s) Oral three times a day  polyethylene glycol 3350 17 Gram(s) Oral two times a day  sodium chloride 0.9%. 1000 milliLiter(s) (30 mL/Hr) IV Continuous <Continuous>    MEDICATIONS  (PRN):  artificial tears (preservative free) Ophthalmic Solution 1 Drop(s) Both EYES two times a day PRN Dry Eyes  clonazePAM Tablet 2 milliGRAM(s) Oral two times a day PRN anxiety  naloxone Injectable 0.1 milliGRAM(s) IV Push every 3 minutes PRN For ANY of the following changes in patient status:  A. RR LESS THAN 10 breaths per minute, B. Oxygen saturation LESS THAN 90%, C. Sedation score of 6  ondansetron Injectable 4 milliGRAM(s) IV Push every 6 hours PRN Nausea      PHYSICAL EXAM  KPS 60.  Vital Signs Last 24 Hrs  T(C): 36.4 (21 Jan 2019 23:21), Max: 37.4 (21 Jan 2019 19:21)  T(F): 97.5 (21 Jan 2019 23:21), Max: 99.3 (21 Jan 2019 19:21)  HR: 81 (22 Jan 2019 06:23) (74 - 91)  BP: 116/58 (22 Jan 2019 06:23) (116/58 - 155/80)  BP(mean): --  RR: 17 (22 Jan 2019 06:23) (17 - 19)  SpO2: 100% (22 Jan 2019 06:23) (95% - 100%)    General: Well nourished, well developed, moderately uncomfortable, alert,oriented  HEENT: NC/AT; EOMI, PERRL, sclera nonicteric; external ears normal; no rhinorrhea or epistaxis; mucous membranes moist; oropharynx clear and without erythema  CV: NR, RR; no appreciable r/m/g  Lungs: CTAB, no increased work of breathing  Abdomen: Bowel sounds present; soft, NTND-moderate tenderness to palpation above the symphysis.  MSK:  mild lower back pain tenderness, able to straight leg raise b/l. Longstanding bilat. leg edema.  Neuro: AAOx3; cranial nerves II-XII intact; strength 5/5 in upper and lower extremities; sensation to light touch in tact bilaterally.  Psych: Full affect; mood congruent  Skin: no visible rashes on limited examination        ASSESSMENT/PLAN    PARTHA MALIK is a 68y woman with endometrial cervical cancer, no previous surgeries, chemotherapy, nor radiation treatment for this cancer.  She tried alternative therapies and her cancer has progressed to the point that palliative care pain medicines   are no longer working and she is now hospitalized having intractable lower back pain and pelvic pain.     We discussed the use of palliative radiation in this setting, namely to improve quality of life through the reduction of symptoms.  We talked about the risks, benefits, acute and long term side effects, as well as expected treatment outcomes.  She was given the opportunity to ask questions, which were answered to her apparent satisfaction.  She would also like to discuss options, chemo and immunotherapy, with medical oncology as well.  She provided written consent to proceed with radiation therapy, pending further discussion with her friends. We will arrange for inpatient treatment and consider a short palliative course of 5 fractions if she agrees.     [phone].

## 2019-01-22 NOTE — CHART NOTE - NSCHARTNOTEFT_GEN_A_CORE
I again reviewed the recommendation for palliative radiation to the pelvis with Ms Isaac and two friends, I indicated that her disease is no longer potentially curable with radiation. We will do simulation tomorrow ,in preparation for 5 palliative pelvic radiation treatments. Ms Isaac would also like to speak with medical oncologist, has no interest in chemotherapy, but is interested in immunotherapy, if there are any optioins....  Filiberto Hunyh MD  [phone]

## 2019-01-22 NOTE — CONSULT NOTE ADULT - ASSESSMENT
68F w/ endocervical adenocarcinoma (diagnosed on outpatient endometrial biopsy by [facility] , May 2017), was recommended to get chemo/RT, however did not receive any treatment, now presenting for pain management.  Oncology consulted as patient had questions about immunotherapy.     # Endocervical adenocarcinoma: dx by endometrial biopsy done May  - FUnctionally, patient lives alone, has no family members.  Has a HCP who was not present at bedside.  Patient's close friends at bedside for discussion.     - discussed immunotherapy with patient- generally is a second-line treatment for patients who progress through or have an inadequate response to chemotherapy.  Also went over side effects which can be similar to and sometimes worse than chemotherapy.    - For endocervical cancer, only immunotherapy available is pembrolizumab, however tumor must express PDL-1 positivity.  To see if patient were eligible for pembrolizumab, would require additional testing on old biopsy specimen, or additional tissue.  - Patient immediately voiced that she did not want to pursue any additional treatment besides radiation at this time.   Offered to refer patient to [facility] for follow-up after radiation for possible immunotherapy, but patient refused.       Brandi Reyes MD  Hematology/Oncology Fellow, PGY-4  pager: [phone]  After 5pm or on weekends, please page the on-call fellow.

## 2019-01-22 NOTE — PROGRESS NOTE ADULT - PROBLEM SELECTOR PLAN 3
- prescribed clonazepam 2mg BID - per patient takes it when needed however has been taking it more at night to help with sleep  - will c/w 2mg BID prn while admitted  - last script sent 1/7 and filled 1/17  [facility] reference #[id number]

## 2019-01-23 DIAGNOSIS — G47.33 OBSTRUCTIVE SLEEP APNEA (ADULT) (PEDIATRIC): ICD-10-CM

## 2019-01-23 PROCEDURE — 77334 RADIATION TREATMENT AID(S): CPT | Mod: 26

## 2019-01-23 PROCEDURE — 77290 THER RAD SIMULAJ FIELD CPLX: CPT | Mod: 26

## 2019-01-23 PROCEDURE — 99233 SBSQ HOSP IP/OBS HIGH 50: CPT

## 2019-01-23 PROCEDURE — 77307 TELETHX ISODOSE PLAN CPLX: CPT | Mod: 26

## 2019-01-23 RX ORDER — KETOROLAC TROMETHAMINE 30 MG/ML
15 SYRINGE (ML) INJECTION ONCE
Qty: 0 | Refills: 0 | Status: DISCONTINUED | OUTPATIENT
Start: 2019-01-23 | End: 2019-01-23

## 2019-01-23 RX ADMIN — LACTULOSE 10 GRAM(S): 10 SOLUTION ORAL at 06:44

## 2019-01-23 RX ADMIN — HYDROMORPHONE HYDROCHLORIDE 30 MILLILITER(S): 2 INJECTION INTRAMUSCULAR; INTRAVENOUS; SUBCUTANEOUS at 19:17

## 2019-01-23 RX ADMIN — Medication 2 MILLIGRAM(S): at 04:49

## 2019-01-23 RX ADMIN — LACTULOSE 10 GRAM(S): 10 SOLUTION ORAL at 23:24

## 2019-01-23 RX ADMIN — SODIUM CHLORIDE 30 MILLILITER(S): 9 INJECTION INTRAMUSCULAR; INTRAVENOUS; SUBCUTANEOUS at 22:15

## 2019-01-23 RX ADMIN — SODIUM CHLORIDE 30 MILLILITER(S): 9 INJECTION INTRAMUSCULAR; INTRAVENOUS; SUBCUTANEOUS at 11:50

## 2019-01-23 RX ADMIN — Medication 2 MILLIGRAM(S): at 18:13

## 2019-01-23 RX ADMIN — GABAPENTIN 1200 MILLIGRAM(S): 400 CAPSULE ORAL at 22:15

## 2019-01-23 RX ADMIN — POLYETHYLENE GLYCOL 3350 17 GRAM(S): 17 POWDER, FOR SOLUTION ORAL at 06:44

## 2019-01-23 RX ADMIN — POLYETHYLENE GLYCOL 3350 17 GRAM(S): 17 POWDER, FOR SOLUTION ORAL at 17:12

## 2019-01-23 RX ADMIN — LACTULOSE 10 GRAM(S): 10 SOLUTION ORAL at 00:23

## 2019-01-23 RX ADMIN — Medication 10 MILLIGRAM(S): at 22:15

## 2019-01-23 RX ADMIN — SODIUM CHLORIDE 30 MILLILITER(S): 9 INJECTION INTRAMUSCULAR; INTRAVENOUS; SUBCUTANEOUS at 19:18

## 2019-01-23 RX ADMIN — LACTULOSE 10 GRAM(S): 10 SOLUTION ORAL at 11:51

## 2019-01-23 RX ADMIN — Medication 15 MILLIGRAM(S): at 04:49

## 2019-01-23 RX ADMIN — HYDROMORPHONE HYDROCHLORIDE 30 MILLILITER(S): 2 INJECTION INTRAMUSCULAR; INTRAVENOUS; SUBCUTANEOUS at 07:28

## 2019-01-23 RX ADMIN — Medication 15 MILLIGRAM(S): at 05:20

## 2019-01-23 RX ADMIN — HYDROMORPHONE HYDROCHLORIDE 30 MILLILITER(S): 2 INJECTION INTRAMUSCULAR; INTRAVENOUS; SUBCUTANEOUS at 14:44

## 2019-01-23 RX ADMIN — LACTULOSE 10 GRAM(S): 10 SOLUTION ORAL at 17:11

## 2019-01-23 NOTE — PROGRESS NOTE ADULT - PROBLEM SELECTOR PLAN 3
- prescribed clonazepam 2mg BID - per patient takes it when needed however has been taking it more at night to help with sleep  - will c/w 2mg BID prn while admitted  - last script sent 1/7 and filled 1/17  [facility] reference #[id number]

## 2019-01-23 NOTE — PROGRESS NOTE ADULT - PROBLEM SELECTOR PLAN 2
Plan for 5 sessions of palliative RT - completes 1/30/19 - Patient states she does not want further disease modifying treatment and would like hospice.  Requesting a [facility] referral - social work aware.  Continue supportive care. Plan for 5 sessions of palliative RT - completes 1/30/19 - Patient states she does not want further disease modifying treatment and would like hospice.  Continue supportive care.

## 2019-01-23 NOTE — PROGRESS NOTE ADULT - PROBLEM SELECTOR PLAN 1
Patient reports unrelieved pain despite PCA increase yesterday.  Of note, patient received 40mg IV Dilaudid in addition to MS Contin 30mg PO TID - Discussed at length with patient.  Patient declines Methadone as it has made her nauseous in the past.  The goal would be to be discharged with the PCA due to her high requirements - either managed by Dr. Levine or hospice - depending on dispo plan.  MS Contin discontinued - Continuous added to patient's PCA - Dilaudid 5mg/ml - continuous 1mg/hr, demand 2mg IV q30min PRN.  Educated on appropriate use of PCA - verbalized understanding.  Patient states her home dose of Neurontin is 1200mg PO QHS - ordered.  Patient states that she would sacrifice her alertness to control her pain.  Goal at this time is to control her pain as much as possible while allowing her to be able to carry our her ADL's.  Bowel regimen. Patient reports overall improvement in pain with the PCA changes from yesterday and is content with current regimen.  Sitting up at bedside and eating breakfast.  No changes to current regimen - continue Dilaudid PCA - 5mg/ml, continuous 1, demand 2mg, q30min.  Using PCA appropriately.  Patient would benefit from PICC line given long term PCA usage due to high pain medication requirements.  Bowel regimen.

## 2019-01-23 NOTE — PROGRESS NOTE ADULT - PROBLEM SELECTOR PLAN 5
Holistic nursing offered and accepted.  Psychosocial support provided.  Will follow-up. Will continue to follow for ongoing pain management.  Psychosocial support provided.  Will follow-up.

## 2019-01-23 NOTE — CHART NOTE - NSCHARTNOTEFT_GEN_A_CORE
patient asking about other institutional alternatives to [place], ie., assisted living facilities, recommended that she discuss with inpatient team.  Mitul Huynh  [phone]

## 2019-01-23 NOTE — PROGRESS NOTE ADULT - PROBLEM SELECTOR PLAN 2
-not resectable per pt, never had chemo/RT, pt was seen by obgyn Dr. Rianna Gray in past  -case d/w rad/onc Dr. Huynh, plan for palliative RT x5 sessions, simulation today and first RT tomorrow, complete RT next Wed  -oncology consult appreciated, pt declined immunotherapy at this time, outpt f/u if she changes her mind  - MRI in 11/2018 confirming progression of disease  - patient not interested in chemotherapy, has been only pursuing alternative measures   -f/u [place] hospice and SW

## 2019-01-23 NOTE — PROGRESS NOTE ADULT - PROBLEM SELECTOR PLAN 1
- increased pain not controlled with PO morphine ER and PO hydromorphone q4 outpatient, pt remains with suboptimally controlled pain despite IV 2mg Dilaudid q3 hours.   -palliative care f/u appreciated, changed PCA to  continuous 1mg/hr, demand 2mg IV q30min PRN.   - Pt has tried methadone in the past and didn't like the side effects of nausea   -c/w bowel regimen, lactulose, monitor BM  -pt has been accepted to hospice at [facility] when she completes her palliative RT next Wed  - Outpt palliative MD Dr. Levine also aware of hospital course

## 2019-01-23 NOTE — PROGRESS NOTE ADULT - SUBJECTIVE AND OBJECTIVE BOX
Donita Mayen MD  Pager [phone]    CHIEF COMPLAINT: Patient is a 68y old  female who presents with a chief complaint of uncontrolled pain (22 Jan 2019 15:18)      SUBJECTIVE / OVERNIGHT EVENTS:  pt reports uncontrolled pain last night, better this am; declined immunotherapy, agreeable to palliative RT at this time    MEDICATIONS  (STANDING):  bisacodyl 10 milliGRAM(s) Oral at bedtime  gabapentin 1200 milliGRAM(s) Oral at bedtime  HYDROmorphone PCA (5 mG/mL) 30 milliLiter(s) PCA Continuous PCA Continuous  lactulose Syrup 10 Gram(s) Oral every 6 hours  polyethylene glycol 3350 17 Gram(s) Oral two times a day  sodium chloride 0.9%. 1000 milliLiter(s) (30 mL/Hr) IV Continuous <Continuous>    MEDICATIONS  (PRN):  artificial tears (preservative free) Ophthalmic Solution 1 Drop(s) Both EYES two times a day PRN Dry Eyes  clonazePAM Tablet 2 milliGRAM(s) Oral two times a day PRN anxiety  naloxone Injectable 0.1 milliGRAM(s) IV Push every 3 minutes PRN For ANY of the following changes in patient status:  A. RR LESS THAN 10 breaths per minute, B. Oxygen saturation LESS THAN 90%, C. Sedation score of 6  ondansetron Injectable 4 milliGRAM(s) IV Push every 6 hours PRN Nausea      VITALS:  T(F): 97.3 (01-23-19 @ 12:28), Max: 98 (01-22-19 @ 23:30)  HR: 84 (01-23-19 @ 12:28) (79 - 84)  BP: 120/67 (01-23-19 @ 12:28) (106/62 - 143/84)  RR: 18 (01-23-19 @ 12:28) (17 - 18)  SpO2: 99% (01-23-19 @ 12:28)      CAPILLARY BLOOD GLUCOSE    Output     I&O's Summary  T(F): 97.3 (01-23-19 @ 12:28), Max: 98 (01-22-19 @ 23:30)  HR: 84 (01-23-19 @ 12:28) (79 - 84)  BP: 120/67 (01-23-19 @ 12:28) (106/62 - 143/84)  RR: 18 (01-23-19 @ 12:28) (17 - 18)  SpO2: 99% (01-23-19 @ 12:28)    PHYSICAL EXAM:  GENERAL: NAD, well-developed  HEAD:  Atraumatic, Normocephalic  EYES: EOMI, PERRLA, conjunctiva and sclera clear  NECK: Supple, No JVD  CHEST/LUNG: Clear to auscultation bilaterally; No wheeze  HEART: Regular rate and rhythm; No murmurs, rubs, or gallops  ABDOMEN: Soft, +tender in b/l LQ, mildly distended; Bowel sounds present  EXTREMITIES:  2+ Peripheral Pulses, No clubbing, cyanosis, or edema  PSYCH: AAOx3  SKIN: No rashes or lesions    LABS:              9.2                  137  | 29   | 6            7.40  >-----------< 379     ------------------------< 104                   30.3                 4.5  | 97   | 0.64                                         Ca 9.5   Mg 1.8   Ph 4.0        MICROBIOLOGY:    RADIOLOGY & ADDITIONAL TESTS:    Imaging Personally Reviewed:    [ ] Consultant(s) Notes Reviewed:  [x ] Care Discussed with Consultants/Other Providers: rad/onc Dr. Huynh, simulation today, plan for 5 sessions of palliative RT through next Wed

## 2019-01-23 NOTE — PROGRESS NOTE ADULT - PROBLEM SELECTOR PLAN 3
Chronic for patient.  She states she takes lactulose 4 times a day at home - ordered here - also on Senna, miralax and dulcolax (takes a baseline). Chronic for patient.  Continue aggressive bowel regimen.

## 2019-01-23 NOTE — PROGRESS NOTE ADULT - SUBJECTIVE AND OBJECTIVE BOX
INTERVAL HPI/OVERNIGHT EVENTS: Pain improved     Code Status: DNR  Allergies    No Known Allergies    Intolerances    MEDICATIONS  (STANDING):  bisacodyl 10 milliGRAM(s) Oral at bedtime  gabapentin 1200 milliGRAM(s) Oral at bedtime  HYDROmorphone PCA (5 mG/mL) 30 milliLiter(s) PCA Continuous PCA Continuous  lactulose Syrup 10 Gram(s) Oral every 6 hours  polyethylene glycol 3350 17 Gram(s) Oral two times a day  sodium chloride 0.9%. 1000 milliLiter(s) (30 mL/Hr) IV Continuous <Continuous>    MEDICATIONS  (PRN):  artificial tears (preservative free) Ophthalmic Solution 1 Drop(s) Both EYES two times a day PRN Dry Eyes  clonazePAM Tablet 2 milliGRAM(s) Oral two times a day PRN anxiety  naloxone Injectable 0.1 milliGRAM(s) IV Push every 3 minutes PRN For ANY of the following changes in patient status:  A. RR LESS THAN 10 breaths per minute, B. Oxygen saturation LESS THAN 90%, C. Sedation score of 6  ondansetron Injectable 4 milliGRAM(s) IV Push every 6 hours PRN Nausea      PRESENT SYMPTOMS: [ ]Unable to obtain due to poor mentation   Source if other than patient:  [ ]Family   [ ]Team     Pain (Impact on QOL):  P    Dyspnea:  Yes [ ] No [ ] - [ ]Mild [ ]Moderate [ ]Severe  Anxiety:    Yes [ ] No [ ] - [ ]Mild [ ]Moderate [ ]Severe  Fatigue:    Yes [ ] No [ ] - [ ]Mild [ ]Moderate [ ]Severe  Nausea:    Yes [ ] No [ ] - [ ]Mild [ ]Moderate [ ]Severe                         Loss of appetite: Yes [ ] No [ ] - [ ]Mild [ ]Moderate [ ]Severe             Constipation:  Yes [ ] No [ ] - [ ]Mild [ ]Moderate [ ]Severe    PAIN AD Score:	  http://geriatrictoolkit.[place].[email or web address]/cog/painad.pdf (Ctrl + left click to view)    Other Symptoms:  [ ]All other review of systems negative     Karnofsky Performance Score/Palliative Performance Status Version 2:         %    http://palliative.info/resource_material/PPSv2.pdf    PHYSICAL EXAM:  Vital Signs Last 24 Hrs  T(C): 36.3 (23 Jan 2019 12:28), Max: 36.7 (22 Jan 2019 23:30)  T(F): 97.3 (23 Jan 2019 12:28), Max: 98 (22 Jan 2019 23:30)  HR: 84 (23 Jan 2019 12:28) (79 - 84)  BP: 120/67 (23 Jan 2019 12:28) (106/62 - 143/84)  BP(mean): --  RR: 18 (23 Jan 2019 12:28) (17 - 18)  SpO2: 99% (23 Jan 2019 12:28) (95% - 100%) I&O's Summary   GENERAL:  [ ]Alert  [ ]Oriented x   [ ]Lethargic  [ ]Cachexia  [ ]Unarousable  [ ]Verbal  [ ]Non-Verbal  Behavioral:   [ ] Anxiety  [ ] Delirium [ ] Agitation [ ] Other  HEENT:  [ ]Normal   [ ]Dry mouth   [ ]ET Tube/Trach  [ ]Oral lesions  PULMONARY:   [ ]Clear [ ]Tachypnea  [ ]Audible excessive secretions   [ ]Rhonchi        [ ]Right [ ]Left [ ]Bilateral  [ ]Crackles        [ ]Right [ ]Left [ ]Bilateral  [ ]Wheezing     [ ]Right [ ]Left [ ]Bilateral  CARDIOVASCULAR:    [ ]Regular [ ]Irregular [ ]Tachy  [ ]Dex [ ]Murmur [ ]Other  GASTROINTESTINAL:  [ ]Soft  [ ]Distended   [ ]+BS  [ ]Non tender [ ]Tender  [ ]PEG [ ]OGT/ NGT   Last BM:    GENITOURINARY:  [ ]Normal [ ] Incontinent   [ ]Oliguria/Anuria   [ ]Otto  MUSCULOSKELETAL:   [ ]Normal   [ ]Weakness  [ ]Bed/Wheelchair bound [ ]Edema  NEUROLOGIC:   [ ]No focal deficits  [ ] Cognitive impairment  [ ] Dysphagia [ ]Dysarthria [ ] Paresis [ ]Other   SKIN:   [ ]Normal   [ ]Pressure ulcer(s)  [ ]Rash    CRITICAL CARE:  [ ] Shock Present  [ ]Septic [ ]Cardiogenic [ ]Neurologic [ ]Hypovolemic  [ ]  Vasopressors [ ]  Inotropes   [ ] Respiratory failure present  [ ] Acute  [ ] Chronic [ ] Hypoxic  [ ] Hypercarbic [ ] Other  [ ] Other organ failure     LABS:                        9.2    7.40  )-----------( 379      ( 22 Jan 2019 06:56 )             30.3   01-22    137  |  97<L>  |  6<L>  ----------------------------<  104<H>  4.5   |  29  |  0.64    Ca    9.5      22 Jan 2019 06:56  Phos  4.0     01-22  Mg     1.8     01-22          RADIOLOGY & ADDITIONAL STUDIES:    Protein Calorie Malnutrition Present: [ ] yes [ ] no  [ ] PPSV2 < or = 30%  [ ] significant weight loss [ ] poor nutritional intake [ ] anasarca [ ] catabolic state Albumin, Serum: 4.0 g/dL (01-18-19 @ 17:53)      REFERRALS:   [ ]Chaplaincy  [ ] Hospice  [ ]Child Life  [ ]Social Work  [ ]Case management [ ]Holistic Therapy   Goals of Care Document: INTERVAL HPI/OVERNIGHT EVENTS: Pain improved     Code Status: DNR  Allergies    No Known Allergies    Intolerances    MEDICATIONS  (STANDING):  bisacodyl 10 milliGRAM(s) Oral at bedtime  gabapentin 1200 milliGRAM(s) Oral at bedtime  HYDROmorphone PCA (5 mG/mL) 30 milliLiter(s) PCA Continuous PCA Continuous  lactulose Syrup 10 Gram(s) Oral every 6 hours  polyethylene glycol 3350 17 Gram(s) Oral two times a day  sodium chloride 0.9%. 1000 milliLiter(s) (30 mL/Hr) IV Continuous <Continuous>    MEDICATIONS  (PRN):  artificial tears (preservative free) Ophthalmic Solution 1 Drop(s) Both EYES two times a day PRN Dry Eyes  clonazePAM Tablet 2 milliGRAM(s) Oral two times a day PRN anxiety  naloxone Injectable 0.1 milliGRAM(s) IV Push every 3 minutes PRN For ANY of the following changes in patient status:  A. RR LESS THAN 10 breaths per minute, B. Oxygen saturation LESS THAN 90%, C. Sedation score of 6  ondansetron Injectable 4 milliGRAM(s) IV Push every 6 hours PRN Nausea      PRESENT SYMPTOMS: [ ]Unable to obtain due to poor mentation   Source if other than patient:  [ ]Family   [ ]Team     Pain (Impact on QOL):  Patient reports pain is overall improved - currently 6/10 - sharp - aggravated by movement - relieved with opioids    Dyspnea:  Yes [ ] No [x ] - [ ]Mild [ ]Moderate [ ]Severe  Anxiety:    Yes [ x] No [ ] - [x ]Mild [ ]Moderate [ ]Severe  Fatigue:    Yes [x ] No [ ] - [x ]Mild [ ]Moderate [ ]Severe  Nausea:    Yes [ ] No [x ] - [ ]Mild [ ]Moderate [ ]Severe                         Loss of appetite: Yes [ ] No [ x] - [ ]Mild [ ]Moderate [ ]Severe             Constipation:  Yes [ ] No [ x] - [ ]Mild [ ]Moderate [ ]Severe    PAIN AD Score:	  http://geriatrictoolkit.[place].edu/cog/painad.pdf (Ctrl + left click to view)    Other Symptoms:  [ x]All other review of systems negative     Karnofsky Performance Score/Palliative Performance Status Version 2:      50%    http://palliative.info/resource_material/PPSv2.pdf    PHYSICAL EXAM:  Vital Signs Last 24 Hrs  T(C): 36.3 (23 Jan 2019 12:28), Max: 36.7 (22 Jan 2019 23:30)  T(F): 97.3 (23 Jan 2019 12:28), Max: 98 (22 Jan 2019 23:30)  HR: 84 (23 Jan 2019 12:28) (79 - 84)  BP: 120/67 (23 Jan 2019 12:28) (106/62 - 143/84)  BP(mean): --  RR: 18 (23 Jan 2019 12:28) (17 - 18)  SpO2: 99% (23 Jan 2019 12:28) (95% - 100%) I&O's Summary     GENERAL:  [x ]Alert  [x ]Oriented x 4  [ ]Lethargic  [ ]Cachexia  [ ]Unarousable  [x ]Verbal  [ ]Non-Verbal  PULMONARY:   [ x]Clear 0 anteriorly    [ ]Rhonchi        [ ]Right [ ]Left [ ]Bilateral  [ ]Crackles        [ ]Right [ ]Left [ ]Bilateral  [ ]Wheezing     [ ]Right [ ]Left [ ]Bilateral  CARDIOVASCULAR:    [x ]Regular [ ]Irregular [ ]Tachy  [ ]Dex [ ]Murmur [ ]Other  GASTROINTESTINAL:  [ x]Soft  [ ]Distended   [x ]+BS  [ x]Non tender [ ]Tender  [ ]PEG [ ]OGT/ NGT   Last BM:  1/22/19  GENITOURINARY:  [x ]Normal [ ] Incontinent   [ ]Oliguria/Anuria   [ ]Otto  MUSCULOSKELETAL:   [ ]Normal   [x ]Weakness  [ ]Bed/Wheelchair bound [ ]Edema  NEUROLOGIC:   [ x]No focal deficits  [ ] Cognitive impairment  [ ] Dysphagia [ ]Dysarthria [ ] Paresis [ ]Other   SKIN:   [x ]Normal   [ ]Pressure ulcer(s)  [ ]Rash    CRITICAL CARE:  [ ] Shock Present  [ ]Septic [ ]Cardiogenic [ ]Neurologic [ ]Hypovolemic  [ ]  Vasopressors [ ]  Inotropes   [ ] Respiratory failure present  [ ] Acute  [ ] Chronic [ ] Hypoxic  [ ] Hypercarbic [ ] Other  [ ] Other organ failure     LABS:                        9.2    7.40  )-----------( 379      ( 22 Jan 2019 06:56 )             30.3   01-22    137  |  97<L>  |  6<L>  ----------------------------<  104<H>  4.5   |  29  |  0.64    Ca    9.5      22 Jan 2019 06:56  Phos  4.0     01-22  Mg     1.8     01-22          RADIOLOGY & ADDITIONAL STUDIES:    Protein Calorie Malnutrition Present: [ ] yes [ ] no  [ ] PPSV2 < or = 30%  [ ] significant weight loss [ ] poor nutritional intake [ ] anasarca [ ] catabolic state Albumin, Serum: 4.0 g/dL (01-18-19 @ 17:53)      REFERRALS:   [ ]Chaplaincy  [ ] Hospice  [ ]Child Life  [ ]Social Work  [ ]Case management [ ]Holistic Therapy   Goals of Care Document:

## 2019-01-24 LAB
ANION GAP SERPL CALC-SCNC: 9 MMO/L — SIGNIFICANT CHANGE UP (ref 7–14)
BUN SERPL-MCNC: 4 MG/DL — LOW (ref 7–23)
CALCIUM SERPL-MCNC: 9.4 MG/DL — SIGNIFICANT CHANGE UP (ref 8.4–10.5)
CHLORIDE SERPL-SCNC: 98 MMOL/L — SIGNIFICANT CHANGE UP (ref 98–107)
CO2 SERPL-SCNC: 30 MMOL/L — SIGNIFICANT CHANGE UP (ref 22–31)
CREAT SERPL-MCNC: 0.71 MG/DL — SIGNIFICANT CHANGE UP (ref 0.5–1.3)
GLUCOSE SERPL-MCNC: 100 MG/DL — HIGH (ref 70–99)
HCT VFR BLD CALC: 30.4 % — LOW (ref 34.5–45)
HGB BLD-MCNC: 8.9 G/DL — LOW (ref 11.5–15.5)
MCHC RBC-ENTMCNC: 24.9 PG — LOW (ref 27–34)
MCHC RBC-ENTMCNC: 29.3 % — LOW (ref 32–36)
MCV RBC AUTO: 84.9 FL — SIGNIFICANT CHANGE UP (ref 80–100)
NRBC # FLD: 0 K/UL — LOW (ref 25–125)
PLATELET # BLD AUTO: 398 K/UL — SIGNIFICANT CHANGE UP (ref 150–400)
PMV BLD: 8.9 FL — SIGNIFICANT CHANGE UP (ref 7–13)
POTASSIUM SERPL-MCNC: 4.7 MMOL/L — SIGNIFICANT CHANGE UP (ref 3.5–5.3)
POTASSIUM SERPL-SCNC: 4.7 MMOL/L — SIGNIFICANT CHANGE UP (ref 3.5–5.3)
RBC # BLD: 3.58 M/UL — LOW (ref 3.8–5.2)
RBC # FLD: 14.6 % — HIGH (ref 10.3–14.5)
SODIUM SERPL-SCNC: 137 MMOL/L — SIGNIFICANT CHANGE UP (ref 135–145)
WBC # BLD: 6.21 K/UL — SIGNIFICANT CHANGE UP (ref 3.8–10.5)
WBC # FLD AUTO: 6.21 K/UL — SIGNIFICANT CHANGE UP (ref 3.8–10.5)

## 2019-01-24 PROCEDURE — 99233 SBSQ HOSP IP/OBS HIGH 50: CPT

## 2019-01-24 RX ORDER — KETOROLAC TROMETHAMINE 30 MG/ML
15 SYRINGE (ML) INJECTION ONCE
Qty: 0 | Refills: 0 | Status: DISCONTINUED | OUTPATIENT
Start: 2019-01-24 | End: 2019-01-24

## 2019-01-24 RX ADMIN — HYDROMORPHONE HYDROCHLORIDE 30 MILLILITER(S): 2 INJECTION INTRAMUSCULAR; INTRAVENOUS; SUBCUTANEOUS at 20:05

## 2019-01-24 RX ADMIN — LACTULOSE 10 GRAM(S): 10 SOLUTION ORAL at 06:15

## 2019-01-24 RX ADMIN — Medication 10 MILLIGRAM(S): at 17:47

## 2019-01-24 RX ADMIN — HYDROMORPHONE HYDROCHLORIDE 30 MILLILITER(S): 2 INJECTION INTRAMUSCULAR; INTRAVENOUS; SUBCUTANEOUS at 11:10

## 2019-01-24 RX ADMIN — Medication 1 DROP(S): at 11:14

## 2019-01-24 RX ADMIN — LACTULOSE 10 GRAM(S): 10 SOLUTION ORAL at 17:47

## 2019-01-24 RX ADMIN — Medication 2 MILLIGRAM(S): at 17:46

## 2019-01-24 RX ADMIN — Medication 15 MILLIGRAM(S): at 22:26

## 2019-01-24 RX ADMIN — SODIUM CHLORIDE 30 MILLILITER(S): 9 INJECTION INTRAMUSCULAR; INTRAVENOUS; SUBCUTANEOUS at 17:49

## 2019-01-24 RX ADMIN — POLYETHYLENE GLYCOL 3350 17 GRAM(S): 17 POWDER, FOR SOLUTION ORAL at 03:17

## 2019-01-24 RX ADMIN — GABAPENTIN 1200 MILLIGRAM(S): 400 CAPSULE ORAL at 22:25

## 2019-01-24 RX ADMIN — POLYETHYLENE GLYCOL 3350 17 GRAM(S): 17 POWDER, FOR SOLUTION ORAL at 17:47

## 2019-01-24 RX ADMIN — Medication 2 MILLIGRAM(S): at 22:35

## 2019-01-24 RX ADMIN — LACTULOSE 10 GRAM(S): 10 SOLUTION ORAL at 11:13

## 2019-01-24 NOTE — PROGRESS NOTE ADULT - PROBLEM SELECTOR PLAN 3
- prescribed clonazepam 2mg BID - per patient takes it when needed however has been taking it more at night to help with sleep  - will c/w 2mg BID prn while admitted  - last script sent 1/7 and filled 1/17  [facility] reference #[id number]

## 2019-01-24 NOTE — CHART NOTE - NSCHARTNOTEFT_GEN_A_CORE
Notified by RN that pt c/o of her pain being not managed by PCP pump. Pt seen and examined at bedside. Pt complains of lower abdominal pain 5/10. Pt not maxing out on her PCP pump. Encouraged pt to press PCA pump often pt verbalized understanding. Toradol for breakthrough pain.

## 2019-01-24 NOTE — PROGRESS NOTE ADULT - ATTENDING COMMENTS
Patient seen and examined.  Meds, labs and vitals all reviewed.  Agree with note as above.
Patient seen and examined.  Meds, labs and vitals all reviewed.  Agree with NP Note.
Patient seen and examined.  Meds, labs and vitals all reviewed.  Patient examined by physician.   Agree with plan as above.

## 2019-01-24 NOTE — PROGRESS NOTE ADULT - PROBLEM SELECTOR PLAN 3
Chronic for patient.  Continue aggressive bowel regimen.  Can consider SMOG enema if constipation persists.

## 2019-01-24 NOTE — PROGRESS NOTE ADULT - PROBLEM SELECTOR PLAN 1
Patient reports overall improvement in pain with the PCA changes and is content with current regimen.  Sitting up at bedside and eating breakfast.  No changes to current regimen - continue Dilaudid PCA - 5mg/ml, continuous 1mg, demand 2mg, q30min.  Using PCA appropriately.  Patient would benefit from PICC line given long term PCA usage due to high pain medication requirements.  Endorsed to team.  Bowel regimen.

## 2019-01-24 NOTE — PROGRESS NOTE ADULT - PROBLEM SELECTOR PLAN 2
Plan for 5 sessions of palliative RT - completes 1/30/19 - Patient states she does not want further disease modifying treatment and would like hospice.  Goal is [place] post completion.  Continue supportive care.

## 2019-01-24 NOTE — PROGRESS NOTE ADULT - SUBJECTIVE AND OBJECTIVE BOX
INTERVAL HPI/OVERNIGHT EVENTS: Pain overall improved.     Code Status: DNR  Allergies    No Known Allergies    Intolerances    MEDICATIONS  (STANDING):  bisacodyl 10 milliGRAM(s) Oral at bedtime  bisacodyl Suppository 10 milliGRAM(s) Rectal once  gabapentin 1200 milliGRAM(s) Oral at bedtime  HYDROmorphone PCA (5 mG/mL) 30 milliLiter(s) PCA Continuous PCA Continuous  lactulose Syrup 10 Gram(s) Oral every 6 hours  polyethylene glycol 3350 17 Gram(s) Oral two times a day  sodium chloride 0.9%. 1000 milliLiter(s) (30 mL/Hr) IV Continuous <Continuous>    MEDICATIONS  (PRN):  artificial tears (preservative free) Ophthalmic Solution 1 Drop(s) Both EYES two times a day PRN Dry Eyes  clonazePAM Tablet 2 milliGRAM(s) Oral two times a day PRN anxiety  naloxone Injectable 0.1 milliGRAM(s) IV Push every 3 minutes PRN For ANY of the following changes in patient status:  A. RR LESS THAN 10 breaths per minute, B. Oxygen saturation LESS THAN 90%, C. Sedation score of 6  ondansetron Injectable 4 milliGRAM(s) IV Push every 6 hours PRN Nausea      PRESENT SYMPTOMS: [ ]Unable to obtain due to poor mentation   Source if other than patient:  [ ]Family   [ ]Team     Pain (Impact on QOL):  Currently 5/10 - lower abdomen/pelvic area - sharp - aggravated by movement and relieved with opioids     Dyspnea:  Yes [ ] No [x ] - [ ]Mild [ ]Moderate [ ]Severe  Anxiety:    Yes [ x] No [ ] - [ x]Mild [ ]Moderate [ ]Severe  Fatigue:    Yes [x ] No [ ] - [x ]Mild [ ]Moderate [ ]Severe  Nausea:    Yes [ ] No [ x] - [ ]Mild [ ]Moderate [ ]Severe                         Loss of appetite: Yes [ ] No [x ] - [ ]Mild [ ]Moderate [ ]Severe             Constipation:  Yes [ x] No [ ] - [ ]Mild [ ]Moderate [ ]Severe    PAIN AD Score:	  http://geriatrictoolkit.[email or web address].[facility]/cog/painad.pdf (Ctrl + left click to view)    Other Symptoms:  [x ]All other review of systems negative     Karnofsky Performance Score/Palliative Performance Status Version 2:   50-60%    http://palliative.info/resource_material/PPSv2.pdf    PHYSICAL EXAM:  Vital Signs Last 24 Hrs  T(C): 37.2 (24 Jan 2019 12:19), Max: 37.2 (24 Jan 2019 12:19)  T(F): 99 (24 Jan 2019 12:19), Max: 99 (24 Jan 2019 12:19)  HR: 85 (24 Jan 2019 12:19) (76 - 98)  BP: 107/53 (24 Jan 2019 12:19) (107/53 - 135/82)  BP(mean): --  RR: 18 (24 Jan 2019 12:19) (17 - 18)  SpO2: 99% (24 Jan 2019 12:19) (96% - 99%) I&O's Summary     GENERAL:  [x ]Alert  [x ]Oriented x 4  [ ]Lethargic  [ ]Cachexia  [ ]Unarousable  [x ]Verbal  [ ]Non-Verbal  PULMONARY:   [x ]Clear - anteriorly    [ ]Rhonchi        [ ]Right [ ]Left [ ]Bilateral  [ ]Crackles        [ ]Right [ ]Left [ ]Bilateral  [ ]Wheezing     [ ]Right [ ]Left [ ]Bilateral  CARDIOVASCULAR:    [x ]Regular [ ]Irregular [ ]Tachy  [ ]Dex [ ]Murmur [ ]Other  GASTROINTESTINAL:  [x ]Soft  [ ]Distended   [x ]+BS  [x ]Non tender [ ]Tender  [ ]PEG [ ]OGT/ NGT   Last BM:  1/22/19  GENITOURINARY:  [x ]Normal [ ] Incontinent   [ ]Oliguria/Anuria   [ ]Otto  MUSCULOSKELETAL:   [ ]Normal   [ x]Weakness  [ ]Bed/Wheelchair bound [ ]Edema  NEUROLOGIC:   [x ]No focal deficits  [ ] Cognitive impairment  [ ] Dysphagia [ ]Dysarthria [ ] Paresis [ ]Other   SKIN:   [x ]Normal   [ ]Pressure ulcer(s)  [ ]Rash    CRITICAL CARE:  [ ] Shock Present  [ ]Septic [ ]Cardiogenic [ ]Neurologic [ ]Hypovolemic  [ ]  Vasopressors [ ]  Inotropes   [ ] Respiratory failure present  [ ] Acute  [ ] Chronic [ ] Hypoxic  [ ] Hypercarbic [ ] Other  [ ] Other organ failure     LABS:                        8.9    6.21  )-----------( 398      ( 24 Jan 2019 05:10 )             30.4   01-24    137  |  98  |  4<L>  ----------------------------<  100<H>  4.7   |  30  |  0.71    Ca    9.4      24 Jan 2019 05:10          RADIOLOGY & ADDITIONAL STUDIES:    Protein Calorie Malnutrition Present: [ ] yes [ ] no  [ ] PPSV2 < or = 30%  [ ] significant weight loss [ ] poor nutritional intake [ ] anasarca [ ] catabolic state Albumin, Serum: 4.0 g/dL (01-18-19 @ 17:53)      REFERRALS:   [ ]Chaplaincy  [ ] Hospice  [ ]Child Life  [ ]Social Work  [ ]Case management [ ]Holistic Therapy   Goals of Care Document:

## 2019-01-24 NOTE — PROGRESS NOTE ADULT - PROBLEM SELECTOR PLAN 5
Will continue to follow for ongoing pain management.  Psychosocial support provided.  Will follow-up.

## 2019-01-24 NOTE — PROGRESS NOTE ADULT - PROBLEM SELECTOR PLAN 1
- increased pain not controlled with PO morphine ER and PO hydromorphone q4 outpatient,   -now on PCA continuous 1mg/hr, demand 2mg IV q30min PRN, pain management per palliative care  - Pt has tried methadone in the past and didn't like the side effects of nausea   -c/w bowel regimen, lactulose, will give suppository and enema today for opioid induced constipation, monitor BM  -pt has been accepted to hospice at [facility] when she completes her palliative RT next Wed  - Outpt palliative MD Dr. Levine also aware of hospital course

## 2019-01-24 NOTE — PROGRESS NOTE ADULT - PROBLEM SELECTOR PLAN 2
-not resectable per pt, never had chemo/RT, pt was seen by obgyn Dr. Rianna Gray in past  -case d/w rad/onc Dr. Huynh, plan for palliative RT x5 sessions, RT #1/5 today, expect to complete RT next Wed  -oncology consult appreciated, pt declined immunotherapy at this time, outpt f/u if she changes her mind  - MRI in 11/2018 confirming progression of disease  - patient not interested in chemotherapy, has been only pursuing alternative measures   -f/u [place] hospice and SW

## 2019-01-24 NOTE — PROGRESS NOTE ADULT - SUBJECTIVE AND OBJECTIVE BOX
Donita Mayen MD  Pager [phone]    CHIEF COMPLAINT: Patient is a 68y old  female who presents with a chief complaint of uncontrolled pain (23 Jan 2019 14:02)      SUBJECTIVE / OVERNIGHT EVENTS:  pt reports pain better controlled today, c/o constipation    MEDICATIONS  (STANDING):  bisacodyl 10 milliGRAM(s) Oral at bedtime  bisacodyl Suppository 10 milliGRAM(s) Rectal once  gabapentin 1200 milliGRAM(s) Oral at bedtime  HYDROmorphone PCA (5 mG/mL) 30 milliLiter(s) PCA Continuous PCA Continuous  lactulose Syrup 10 Gram(s) Oral every 6 hours  polyethylene glycol 3350 17 Gram(s) Oral two times a day  sodium chloride 0.9%. 1000 milliLiter(s) (30 mL/Hr) IV Continuous <Continuous>    MEDICATIONS  (PRN):  artificial tears (preservative free) Ophthalmic Solution 1 Drop(s) Both EYES two times a day PRN Dry Eyes  clonazePAM Tablet 2 milliGRAM(s) Oral two times a day PRN anxiety  naloxone Injectable 0.1 milliGRAM(s) IV Push every 3 minutes PRN For ANY of the following changes in patient status:  A. RR LESS THAN 10 breaths per minute, B. Oxygen saturation LESS THAN 90%, C. Sedation score of 6  ondansetron Injectable 4 milliGRAM(s) IV Push every 6 hours PRN Nausea      VITALS:  T(F): 99 (01-24-19 @ 12:19), Max: 99 (01-24-19 @ 12:19)  HR: 85 (01-24-19 @ 12:19) (76 - 98)  BP: 107/53 (01-24-19 @ 12:19) (107/53 - 135/82)  RR: 18 (01-24-19 @ 12:19) (17 - 18)  SpO2: 99% (01-24-19 @ 12:19)      CAPILLARY BLOOD GLUCOSE    Output     I&O's Summary  T(F): 99 (01-24-19 @ 12:19), Max: 99 (01-24-19 @ 12:19)  HR: 85 (01-24-19 @ 12:19) (76 - 98)  BP: 107/53 (01-24-19 @ 12:19) (107/53 - 135/82)  RR: 18 (01-24-19 @ 12:19) (17 - 18)  SpO2: 99% (01-24-19 @ 12:19)    PHYSICAL EXAM:  GENERAL: NAD, well-developed  HEAD:  Atraumatic, Normocephalic  EYES: EOMI, PERRLA, conjunctiva and sclera clear  NECK: Supple, No JVD  CHEST/LUNG: Clear to auscultation bilaterally; No wheeze  HEART: Regular rate and rhythm; No murmurs, rubs, or gallops  ABDOMEN: Soft, +tender in b/l LQ, mildly distended; Bowel sounds present  EXTREMITIES:  2+ Peripheral Pulses, No clubbing, cyanosis, or edema  PSYCH: AAOx3  SKIN: No rashes or lesions    LABS:              8.9                  137  | 30   | 4            6.21  >-----------< 398     ------------------------< 100                   30.4                 4.7  | 98   | 0.71                                         Ca 9.4   Mg x     Ph x          MICROBIOLOGY:    RADIOLOGY & ADDITIONAL TESTS:    Imaging Personally Reviewed:    [ ] Consultant(s) Notes Reviewed:  [x ] Care Discussed with Consultants/Other Providers: rad/onc Dr. Huynh, first RT today, ads np Sandi, bowel regimen, suppository and enema

## 2019-01-25 DIAGNOSIS — K59.03 DRUG INDUCED CONSTIPATION: ICD-10-CM

## 2019-01-25 PROCEDURE — 99233 SBSQ HOSP IP/OBS HIGH 50: CPT

## 2019-01-25 PROCEDURE — 77427 RADIATION TX MANAGEMENT X5: CPT

## 2019-01-25 PROCEDURE — 77280 THER RAD SIMULAJ FIELD SMPL: CPT | Mod: 26

## 2019-01-25 RX ORDER — CLONAZEPAM 1 MG
2 TABLET ORAL
Qty: 0 | Refills: 0 | Status: DISCONTINUED | OUTPATIENT
Start: 2019-01-25 | End: 2019-01-31

## 2019-01-25 RX ORDER — KETOROLAC TROMETHAMINE 30 MG/ML
15 SYRINGE (ML) INJECTION ONCE
Qty: 0 | Refills: 0 | Status: DISCONTINUED | OUTPATIENT
Start: 2019-01-25 | End: 2019-01-25

## 2019-01-25 RX ORDER — METHYLNALTREXONE BROMIDE 12 MG/.6ML
12 INJECTION, SOLUTION SUBCUTANEOUS ONCE
Qty: 0 | Refills: 0 | Status: COMPLETED | OUTPATIENT
Start: 2019-01-25 | End: 2019-01-25

## 2019-01-25 RX ADMIN — GABAPENTIN 1200 MILLIGRAM(S): 400 CAPSULE ORAL at 23:19

## 2019-01-25 RX ADMIN — Medication 15 MILLIGRAM(S): at 14:35

## 2019-01-25 RX ADMIN — POLYETHYLENE GLYCOL 3350 17 GRAM(S): 17 POWDER, FOR SOLUTION ORAL at 19:11

## 2019-01-25 RX ADMIN — LACTULOSE 10 GRAM(S): 10 SOLUTION ORAL at 19:12

## 2019-01-25 RX ADMIN — Medication 15 MILLIGRAM(S): at 14:09

## 2019-01-25 RX ADMIN — Medication 2 MILLIGRAM(S): at 14:09

## 2019-01-25 RX ADMIN — Medication 10 MILLIGRAM(S): at 12:59

## 2019-01-25 RX ADMIN — HYDROMORPHONE HYDROCHLORIDE 30 MILLILITER(S): 2 INJECTION INTRAMUSCULAR; INTRAVENOUS; SUBCUTANEOUS at 08:32

## 2019-01-25 RX ADMIN — LACTULOSE 10 GRAM(S): 10 SOLUTION ORAL at 07:00

## 2019-01-25 RX ADMIN — HYDROMORPHONE HYDROCHLORIDE 30 MILLILITER(S): 2 INJECTION INTRAMUSCULAR; INTRAVENOUS; SUBCUTANEOUS at 19:35

## 2019-01-25 RX ADMIN — LACTULOSE 10 GRAM(S): 10 SOLUTION ORAL at 12:51

## 2019-01-25 RX ADMIN — METHYLNALTREXONE BROMIDE 12 MILLIGRAM(S): 12 INJECTION, SOLUTION SUBCUTANEOUS at 19:10

## 2019-01-25 RX ADMIN — LACTULOSE 10 GRAM(S): 10 SOLUTION ORAL at 00:41

## 2019-01-25 RX ADMIN — POLYETHYLENE GLYCOL 3350 17 GRAM(S): 17 POWDER, FOR SOLUTION ORAL at 07:00

## 2019-01-25 RX ADMIN — Medication 15 MILLIGRAM(S): at 23:49

## 2019-01-25 RX ADMIN — Medication 15 MILLIGRAM(S): at 23:19

## 2019-01-25 NOTE — PROGRESS NOTE ADULT - PROBLEM SELECTOR PLAN 3
- prescribed clonazepam 2mg BID - per patient takes it when needed however has been taking it more at night to help with sleep  - will c/w 2mg BID prn  - last script sent 1/7 and filled 1/17  [facility]stop reference #[id number]

## 2019-01-25 NOTE — PROGRESS NOTE ADULT - PROBLEM SELECTOR PLAN 1
- Continue Dilaudid PCA - 5mg/ml, continuous 1mg, demand 2mg, q30min.  Using PCA appropriately.  Patient would benefit from PICC line given long term PCA usage due to high pain medication requirements.  Endorsed to team.  Bowel regimen.  - Hesitant to increase Dilaudid dose at this time, as patient is spending a large portion of the day sleeping.

## 2019-01-25 NOTE — PROGRESS NOTE ADULT - PROBLEM SELECTOR PLAN 1
- increased pain not controlled with PO morphine ER and PO hydromorphone q4 outpatient,   -now on PCA continuous 1mg/hr, demand 2mg IV q30min PRN, pain management per palliative care  - Pt has tried methadone in the past and didn't like the side effects of nausea   -pt has been accepted to hospice at [facility] when she completes her palliative RT next Wed  - Outpt palliative MD Dr. Levine also aware of hospital course - increased pain not controlled with PO morphine ER and PO hydromorphone q4 outpatient,   -now on PCA continuous 1mg/hr, demand 2mg IV q30min PRN, pain management per palliative care  - Pt has tried methadone in the past and didn't like the side effects of nausea   -pt has been accepted to hospice at [place] when she completes her RT  - Outpt palliative MD Dr. Levine also aware of hospital course

## 2019-01-25 NOTE — PROGRESS NOTE ADULT - PROBLEM SELECTOR PLAN 2
-not resectable per pt, never had chemo/RT, pt was seen by obgyn Dr. Rianna Gray in past  -case d/w rad/onc Dr. Huynh, plan for palliative RT x5 sessions, refused RT yesterday, RT #1/5 today, expect to complete RT next Thur  -oncology consult appreciated, pt declined immunotherapy at this time, outpt f/u if she changes her mind  - MRI in 11/2018 confirming progression of disease  - patient not interested in chemotherapy, has been only pursuing alternative measures   -f/u [facility] hospice and SW

## 2019-01-25 NOTE — PROGRESS NOTE ADULT - PROBLEM SELECTOR PLAN 2
Plan for 5 sessions of palliative RT - completes 1/30/19 - Patient states she does not want further disease modifying treatment and would like hospice.  Goal is [place] post completion.  Continue supportive care.

## 2019-01-25 NOTE — PROGRESS NOTE ADULT - SUBJECTIVE AND OBJECTIVE BOX
INTERVAL HPI/OVERNIGHT EVENTS: Pain overall improved.     Code Status: DNR  Allergies    No Known Allergies    Intolerances    MEDICATIONS  (STANDING):  bisacodyl 10 milliGRAM(s) Oral at bedtime  bisacodyl Suppository 10 milliGRAM(s) Rectal once  gabapentin 1200 milliGRAM(s) Oral at bedtime  HYDROmorphone PCA (5 mG/mL) 30 milliLiter(s) PCA Continuous PCA Continuous  lactulose Syrup 10 Gram(s) Oral every 6 hours  polyethylene glycol 3350 17 Gram(s) Oral two times a day  sodium chloride 0.9%. 1000 milliLiter(s) (30 mL/Hr) IV Continuous <Continuous>    MEDICATIONS  (PRN):  artificial tears (preservative free) Ophthalmic Solution 1 Drop(s) Both EYES two times a day PRN Dry Eyes  clonazePAM Tablet 2 milliGRAM(s) Oral two times a day PRN anxiety  naloxone Injectable 0.1 milliGRAM(s) IV Push every 3 minutes PRN For ANY of the following changes in patient status:  A. RR LESS THAN 10 breaths per minute, B. Oxygen saturation LESS THAN 90%, C. Sedation score of 6  ondansetron Injectable 4 milliGRAM(s) IV Push every 6 hours PRN Nausea      PRESENT SYMPTOMS: [ ]Unable to obtain due to poor mentation   Source if other than patient:  [ ]Family   [ ]Team     Pain (Impact on QOL):  Currently 5/10 - lower abdomen/pelvic area - sharp - aggravated by movement and relieved with opioids     Dyspnea:  Yes [ ] No [x ] - [ ]Mild [ ]Moderate [ ]Severe  Anxiety:    Yes [ x] No [ ] - [ x]Mild [ ]Moderate [ ]Severe  Fatigue:    Yes [x ] No [ ] - [x ]Mild [ ]Moderate [ ]Severe  Nausea:    Yes [ ] No [ x] - [ ]Mild [ ]Moderate [ ]Severe                         Loss of appetite: Yes [ ] No [x ] - [ ]Mild [ ]Moderate [ ]Severe             Constipation:  Yes [ x] No [ ] - [ ]Mild [ ]Moderate [ ]Severe    PAIN AD Score:	  http://geriatrictoolkit.[place].[facility]/cog/painad.pdf (Ctrl + left click to view)    Other Symptoms:  [x ]All other review of systems negative     Karnofsky Performance Score/Palliative Performance Status Version 2:   50-60%    http://palliative.info/resource_material/[email or web address].pdf    PHYSICAL EXAM:  Vital Signs Last 24 Hrs  T(C): 37.2 (24 Jan 2019 12:19), Max: 37.2 (24 Jan 2019 12:19)  T(F): 99 (24 Jan 2019 12:19), Max: 99 (24 Jan 2019 12:19)  HR: 85 (24 Jan 2019 12:19) (76 - 98)  BP: 107/53 (24 Jan 2019 12:19) (107/53 - 135/82)  BP(mean): --  RR: 18 (24 Jan 2019 12:19) (17 - 18)  SpO2: 99% (24 Jan 2019 12:19) (96% - 99%) I&O's Summary     GENERAL:  [x ]Alert  [x ]Oriented x 4  [ ]Lethargic  [ ]Cachexia  [ ]Unarousable  [x ]Verbal  [ ]Non-Verbal  PULMONARY:   [x ]Clear - anteriorly    [ ]Rhonchi        [ ]Right [ ]Left [ ]Bilateral  [ ]Crackles        [ ]Right [ ]Left [ ]Bilateral  [ ]Wheezing     [ ]Right [ ]Left [ ]Bilateral  CARDIOVASCULAR:    [x ]Regular [ ]Irregular [ ]Tachy  [ ]Dex [ ]Murmur [ ]Other  GASTROINTESTINAL:  [x ]Soft  [ ]Distended   [x ]+BS  [x ]Non tender [ ]Tender  [ ]PEG [ ]OGT/ NGT   Last BM:  1/22/19  GENITOURINARY:  [x ]Normal [ ] Incontinent   [ ]Oliguria/Anuria   [ ]Otto  MUSCULOSKELETAL:   [ ]Normal   [ x]Weakness  [ ]Bed/Wheelchair bound [ ]Edema  NEUROLOGIC:   [x ]No focal deficits  [ ] Cognitive impairment  [ ] Dysphagia [ ]Dysarthria [ ] Paresis [ ]Other   SKIN:   [x ]Normal   [ ]Pressure ulcer(s)  [ ]Rash    CRITICAL CARE:  [ ] Shock Present  [ ]Septic [ ]Cardiogenic [ ]Neurologic [ ]Hypovolemic  [ ]  Vasopressors [ ]  Inotropes   [ ] Respiratory failure present  [ ] Acute  [ ] Chronic [ ] Hypoxic  [ ] Hypercarbic [ ] Other  [ ] Other organ failure     LABS:                        8.9    6.21  )-----------( 398      ( 24 Jan 2019 05:10 )             30.4   01-24    137  |  98  |  4<L>  ----------------------------<  100<H>  4.7   |  30  |  0.71    Ca    9.4      24 Jan 2019 05:10          RADIOLOGY & ADDITIONAL STUDIES:    Protein Calorie Malnutrition Present: [ ] yes [ ] no  [ ] PPSV2 < or = 30%  [ ] significant weight loss [ ] poor nutritional intake [ ] anasarca [ ] catabolic state Albumin, Serum: 4.0 g/dL (01-18-19 @ 17:53)      REFERRALS:   [ ]Chaplaincy  [ ] Hospice  [ ]Child Life  [ ]Social Work  [ ]Case management [ ]Holistic Therapy   Goals of Care Document:

## 2019-01-25 NOTE — CHART NOTE - NSCHARTNOTEFT_GEN_A_CORE
ADS NIGHT COVERAGE:    Notified by RN, pt's pain not well controlled. Pt complaining of uncontrolled pain management. Discussed with patient, that she is on a generous dose of dilaudid per hour in which she states does not work. Encouraged patient to keep pump at bedside and to continue using it. Toradol 15mg IV x 1 given tonight for breakthrough pain. Patient also requesting to have her Klonopin at 10pm- last dose given at 2pm. Explained to patient that the earliest she can get it is at 1AM. ADS NIGHT COVERAGE:    Notified by RN, pt's pain not well controlled. Pt complaining of uncontrolled pain management. Discussed with patient, that she is on a generous dose of Dilaudid 5mg per hour in which she states does not work. Encouraged patient to keep pump at bedside and to continue using it. Toradol 15mg IV x 1 given tonight for breakthrough pain. Patient also requesting to have her Klonopin at 10pm- last dose given at 2pm. Explained to patient that the earliest she can get her 2mg Klonopin dose was at 1AM. Patient was displeased. Will continue to monitor and plan to discuss with palliative on better pain management.     FREDI Ramírez PA-C

## 2019-01-25 NOTE — PROGRESS NOTE ADULT - SUBJECTIVE AND OBJECTIVE BOX
Donita Mayen MD  Pager [phone]    CHIEF COMPLAINT: Patient is a 68y old  female who presents with a chief complaint of uncontrolled pain (24 Jan 2019 12:51)      SUBJECTIVE / OVERNIGHT EVENTS:  pt declined RT yesterday due to visitation by friends, still no BM    MEDICATIONS  (STANDING):  bisacodyl 10 milliGRAM(s) Oral at bedtime  gabapentin 1200 milliGRAM(s) Oral at bedtime  HYDROmorphone PCA (5 mG/mL) 30 milliLiter(s) PCA Continuous PCA Continuous  lactulose Syrup 10 Gram(s) Oral every 6 hours  polyethylene glycol 3350 17 Gram(s) Oral two times a day  sodium chloride 0.9%. 1000 milliLiter(s) (30 mL/Hr) IV Continuous <Continuous>    MEDICATIONS  (PRN):  artificial tears (preservative free) Ophthalmic Solution 1 Drop(s) Both EYES two times a day PRN Dry Eyes  clonazePAM Tablet 2 milliGRAM(s) Oral two times a day PRN anxiety  naloxone Injectable 0.1 milliGRAM(s) IV Push every 3 minutes PRN For ANY of the following changes in patient status:  A. RR LESS THAN 10 breaths per minute, B. Oxygen saturation LESS THAN 90%, C. Sedation score of 6  ondansetron Injectable 4 milliGRAM(s) IV Push every 6 hours PRN Nausea      VITALS:  T(F): 98.8 (01-25-19 @ 10:50), Max: 99 (01-24-19 @ 12:19)  HR: 98 (01-25-19 @ 10:50) (67 - 98)  BP: 129/59 (01-25-19 @ 10:50) (105/62 - 129/59)  RR: 16 (01-25-19 @ 10:50) (14 - 18)  SpO2: 100% (01-25-19 @ 10:50)      CAPILLARY BLOOD GLUCOSE    Output     I&O's Summary  T(F): 98.8 (01-25-19 @ 10:50), Max: 99 (01-24-19 @ 12:19)  HR: 98 (01-25-19 @ 10:50) (67 - 98)  BP: 129/59 (01-25-19 @ 10:50) (105/62 - 129/59)  RR: 16 (01-25-19 @ 10:50) (14 - 18)  SpO2: 100% (01-25-19 @ 10:50)    PHYSICAL EXAM:  GENERAL: NAD, well-developed  HEAD:  Atraumatic, Normocephalic  EYES: EOMI, PERRLA, conjunctiva and sclera clear  NECK: Supple, No JVD  CHEST/LUNG: Clear to auscultation bilaterally; No wheeze  HEART: Regular rate and rhythm; No murmurs, rubs, or gallops  ABDOMEN: Soft, +tender in b/l LQ, mildly distended; Bowel sounds present  EXTREMITIES:  2+ Peripheral Pulses, No clubbing, cyanosis, or edema  PSYCH: AAOx3  SKIN: No rashes or lesions  LABS:              8.9                  137  | 30   | 4            6.21  >-----------< 398     ------------------------< 100                   30.4                 4.7  | 98   | 0.71                                         Ca 9.4   Mg x     Ph x          MICROBIOLOGY:    RADIOLOGY & ADDITIONAL TESTS:    Imaging Personally Reviewed:    [ ] Consultant(s) Notes Reviewed:  [x ] Care Discussed with Consultants/Other Providers: ads jt Junior, palliative RT session 1 today, relistor x1

## 2019-01-26 LAB
ANION GAP SERPL CALC-SCNC: 13 MMO/L — SIGNIFICANT CHANGE UP (ref 7–14)
BUN SERPL-MCNC: 9 MG/DL — SIGNIFICANT CHANGE UP (ref 7–23)
CALCIUM SERPL-MCNC: 9.2 MG/DL — SIGNIFICANT CHANGE UP (ref 8.4–10.5)
CHLORIDE SERPL-SCNC: 93 MMOL/L — LOW (ref 98–107)
CO2 SERPL-SCNC: 27 MMOL/L — SIGNIFICANT CHANGE UP (ref 22–31)
CREAT SERPL-MCNC: 0.64 MG/DL — SIGNIFICANT CHANGE UP (ref 0.5–1.3)
GLUCOSE SERPL-MCNC: 100 MG/DL — HIGH (ref 70–99)
HCT VFR BLD CALC: 28 % — LOW (ref 34.5–45)
HGB BLD-MCNC: 8.4 G/DL — LOW (ref 11.5–15.5)
MAGNESIUM SERPL-MCNC: 1.9 MG/DL — SIGNIFICANT CHANGE UP (ref 1.6–2.6)
MCHC RBC-ENTMCNC: 24.5 PG — LOW (ref 27–34)
MCHC RBC-ENTMCNC: 30 % — LOW (ref 32–36)
MCV RBC AUTO: 81.6 FL — SIGNIFICANT CHANGE UP (ref 80–100)
NRBC # FLD: 0 K/UL — LOW (ref 25–125)
PHOSPHATE SERPL-MCNC: 3.3 MG/DL — SIGNIFICANT CHANGE UP (ref 2.5–4.5)
PLATELET # BLD AUTO: 394 K/UL — SIGNIFICANT CHANGE UP (ref 150–400)
PMV BLD: 9.3 FL — SIGNIFICANT CHANGE UP (ref 7–13)
POTASSIUM SERPL-MCNC: 4.5 MMOL/L — SIGNIFICANT CHANGE UP (ref 3.5–5.3)
POTASSIUM SERPL-SCNC: 4.5 MMOL/L — SIGNIFICANT CHANGE UP (ref 3.5–5.3)
RBC # BLD: 3.43 M/UL — LOW (ref 3.8–5.2)
RBC # FLD: 14.7 % — HIGH (ref 10.3–14.5)
SODIUM SERPL-SCNC: 133 MMOL/L — LOW (ref 135–145)
WBC # BLD: 7.97 K/UL — SIGNIFICANT CHANGE UP (ref 3.8–10.5)
WBC # FLD AUTO: 7.97 K/UL — SIGNIFICANT CHANGE UP (ref 3.8–10.5)

## 2019-01-26 PROCEDURE — 99233 SBSQ HOSP IP/OBS HIGH 50: CPT

## 2019-01-26 RX ORDER — HYDROMORPHONE HYDROCHLORIDE 2 MG/ML
30 INJECTION INTRAMUSCULAR; INTRAVENOUS; SUBCUTANEOUS
Qty: 0 | Refills: 0 | Status: DISCONTINUED | OUTPATIENT
Start: 2019-01-26 | End: 2019-01-31

## 2019-01-26 RX ORDER — SENNA PLUS 8.6 MG/1
2 TABLET ORAL AT BEDTIME
Qty: 0 | Refills: 0 | Status: DISCONTINUED | OUTPATIENT
Start: 2019-01-26 | End: 2019-01-31

## 2019-01-26 RX ORDER — SODIUM CHLORIDE 9 MG/ML
1000 INJECTION INTRAMUSCULAR; INTRAVENOUS; SUBCUTANEOUS
Qty: 0 | Refills: 0 | Status: DISCONTINUED | OUTPATIENT
Start: 2019-01-26 | End: 2019-01-31

## 2019-01-26 RX ORDER — KETOROLAC TROMETHAMINE 30 MG/ML
15 SYRINGE (ML) INJECTION ONCE
Qty: 0 | Refills: 0 | Status: DISCONTINUED | OUTPATIENT
Start: 2019-01-26 | End: 2019-01-26

## 2019-01-26 RX ORDER — HYDROMORPHONE HYDROCHLORIDE 2 MG/ML
2 INJECTION INTRAMUSCULAR; INTRAVENOUS; SUBCUTANEOUS ONCE
Qty: 0 | Refills: 0 | Status: DISCONTINUED | OUTPATIENT
Start: 2019-01-26 | End: 2019-01-26

## 2019-01-26 RX ORDER — DOCUSATE SODIUM 100 MG
100 CAPSULE ORAL
Qty: 0 | Refills: 0 | Status: DISCONTINUED | OUTPATIENT
Start: 2019-01-26 | End: 2019-01-31

## 2019-01-26 RX ORDER — HYDROMORPHONE HYDROCHLORIDE 2 MG/ML
4 INJECTION INTRAMUSCULAR; INTRAVENOUS; SUBCUTANEOUS
Qty: 0 | Refills: 0 | Status: DISCONTINUED | OUTPATIENT
Start: 2019-01-26 | End: 2019-01-31

## 2019-01-26 RX ADMIN — HYDROMORPHONE HYDROCHLORIDE 2 MILLIGRAM(S): 2 INJECTION INTRAMUSCULAR; INTRAVENOUS; SUBCUTANEOUS at 15:45

## 2019-01-26 RX ADMIN — HYDROMORPHONE HYDROCHLORIDE 2 MILLIGRAM(S): 2 INJECTION INTRAMUSCULAR; INTRAVENOUS; SUBCUTANEOUS at 15:22

## 2019-01-26 RX ADMIN — SODIUM CHLORIDE 60 MILLILITER(S): 9 INJECTION INTRAMUSCULAR; INTRAVENOUS; SUBCUTANEOUS at 10:16

## 2019-01-26 RX ADMIN — Medication 2 MILLIGRAM(S): at 12:16

## 2019-01-26 RX ADMIN — Medication 10 MILLIGRAM(S): at 23:01

## 2019-01-26 RX ADMIN — HYDROMORPHONE HYDROCHLORIDE 2 MILLIGRAM(S): 2 INJECTION INTRAMUSCULAR; INTRAVENOUS; SUBCUTANEOUS at 15:25

## 2019-01-26 RX ADMIN — Medication 100 MILLIGRAM(S): at 23:05

## 2019-01-26 RX ADMIN — LACTULOSE 10 GRAM(S): 10 SOLUTION ORAL at 12:16

## 2019-01-26 RX ADMIN — LACTULOSE 10 GRAM(S): 10 SOLUTION ORAL at 18:13

## 2019-01-26 RX ADMIN — Medication 2 MILLIGRAM(S): at 03:44

## 2019-01-26 RX ADMIN — POLYETHYLENE GLYCOL 3350 17 GRAM(S): 17 POWDER, FOR SOLUTION ORAL at 18:13

## 2019-01-26 RX ADMIN — Medication 10 MILLIGRAM(S): at 12:16

## 2019-01-26 RX ADMIN — LACTULOSE 10 GRAM(S): 10 SOLUTION ORAL at 23:02

## 2019-01-26 RX ADMIN — SODIUM CHLORIDE 60 MILLILITER(S): 9 INJECTION INTRAMUSCULAR; INTRAVENOUS; SUBCUTANEOUS at 15:14

## 2019-01-26 RX ADMIN — HYDROMORPHONE HYDROCHLORIDE 30 MILLILITER(S): 2 INJECTION INTRAMUSCULAR; INTRAVENOUS; SUBCUTANEOUS at 18:11

## 2019-01-26 RX ADMIN — HYDROMORPHONE HYDROCHLORIDE 30 MILLILITER(S): 2 INJECTION INTRAMUSCULAR; INTRAVENOUS; SUBCUTANEOUS at 20:03

## 2019-01-26 RX ADMIN — HYDROMORPHONE HYDROCHLORIDE 30 MILLILITER(S): 2 INJECTION INTRAMUSCULAR; INTRAVENOUS; SUBCUTANEOUS at 07:44

## 2019-01-26 RX ADMIN — SODIUM CHLORIDE 30 MILLILITER(S): 9 INJECTION INTRAMUSCULAR; INTRAVENOUS; SUBCUTANEOUS at 07:50

## 2019-01-26 RX ADMIN — SENNA PLUS 2 TABLET(S): 8.6 TABLET ORAL at 23:03

## 2019-01-26 NOTE — CHART NOTE - NSCHARTNOTEFT_GEN_A_CORE
Palliative team was paged due to uncontrolled abdominal pain (10/10 as per NP).    Patient on dilaudid PCA  Patient received 30 injections in the last 24 hours.   Demand dose is 2mg.   continues rate is 1mg/hr  Total extra dose (demand doses) received in the last 24 hours: 30X2=60 mg    Will increase continues rate from 1mg/hr to 2mg/hr  will change lock out from 30 min to 15 min  will start Clinician bolus dose of Dilaudid 4mg IV Q2Hr PRN   4 hour limit will be 40mg    Call for more questions

## 2019-01-26 NOTE — PROGRESS NOTE ADULT - PROBLEM SELECTOR PLAN 1
- increased pain not controlled with PO morphine ER and PO hydromorphone q4 outpatient,   -now on PCA continuous 1mg/hr, demand 2mg IV q30min PRN, pain management per palliative care  - Pt has tried methadone in the past and didn't like the side effects of nausea   -pt has been accepted to hospice at [facility] when she completes her RT  - Outpt palliative MD Dr. Levine also aware of hospital course  -will get contrast CT abd/pelvis to assess disease progression

## 2019-01-26 NOTE — PROGRESS NOTE ADULT - PROBLEM SELECTOR PLAN 2
-not resectable per pt, never had chemo/RT, pt was seen by obgyn Dr. Rianna Gray in past  -case d/w rad/onc Dr. Huynh, plan for palliative RT x5 sessions, refused RT yesterday, RT #1/5, plan for RT #2 on Monday, expect to complete RT next Thur  -oncology consult appreciated, pt declined immunotherapy at this time, outpt f/u if she changes her mind  - MRI in 11/2018 confirming progression of disease  - patient not interested in chemotherapy, has been only pursuing alternative measures   -f/u [place] hospice and SW

## 2019-01-26 NOTE — PROGRESS NOTE ADULT - PROBLEM SELECTOR PLAN 3
- prescribed clonazepam 2mg BID - per patient takes it when needed however has been taking it more at night to help with sleep  - will c/w 2mg BID prn  - last script sent 1/7 and filled 1/17  [facility]stop reference #[id number]

## 2019-01-26 NOTE — PROGRESS NOTE ADULT - SUBJECTIVE AND OBJECTIVE BOX
Donita Mayen MD  Pager [phone]    CHIEF COMPLAINT: Patient is a 68y old  female who presents with a chief complaint of uncontrolled pain (25 Jan 2019 11:45)      SUBJECTIVE / OVERNIGHT EVENTS:  pt reports pain uncontrolled last night, no bm     MEDICATIONS  (STANDING):  bisacodyl 10 milliGRAM(s) Oral at bedtime  bisacodyl Suppository 10 milliGRAM(s) Rectal daily  gabapentin 1200 milliGRAM(s) Oral at bedtime  HYDROmorphone PCA (5 mG/mL) 30 milliLiter(s) PCA Continuous PCA Continuous  lactulose Syrup 10 Gram(s) Oral every 6 hours  polyethylene glycol 3350 17 Gram(s) Oral two times a day  sodium chloride 0.9%. 1000 milliLiter(s) (60 mL/Hr) IV Continuous <Continuous>    MEDICATIONS  (PRN):  artificial tears (preservative free) Ophthalmic Solution 1 Drop(s) Both EYES two times a day PRN Dry Eyes  clonazePAM Tablet 2 milliGRAM(s) Oral two times a day PRN anxiety  naloxone Injectable 0.1 milliGRAM(s) IV Push every 3 minutes PRN For ANY of the following changes in patient status:  A. RR LESS THAN 10 breaths per minute, B. Oxygen saturation LESS THAN 90%, C. Sedation score of 6  ondansetron Injectable 4 milliGRAM(s) IV Push every 6 hours PRN Nausea      VITALS:  T(F): 97.8 (01-26-19 @ 07:35), Max: 99 (01-25-19 @ 14:00)  HR: 89 (01-26-19 @ 07:35) (86 - 102)  BP: 132/72 (01-26-19 @ 07:35) (129/76 - 146/74)  RR: 17 (01-26-19 @ 07:35) (16 - 17)  SpO2: 100% (01-26-19 @ 07:35)      CAPILLARY BLOOD GLUCOSE    Output     I&O's Summary  T(F): 97.8 (01-26-19 @ 07:35), Max: 99 (01-25-19 @ 14:00)  HR: 89 (01-26-19 @ 07:35) (86 - 102)  BP: 132/72 (01-26-19 @ 07:35) (129/76 - 146/74)  RR: 17 (01-26-19 @ 07:35) (16 - 17)  SpO2: 100% (01-26-19 @ 07:35)    PHYSICAL EXAM:  GENERAL: NAD, well-developed  HEAD:  Atraumatic, Normocephalic  EYES: EOMI, PERRLA, conjunctiva and sclera clear  NECK: Supple, No JVD  CHEST/LUNG: Clear to auscultation bilaterally; No wheeze  HEART: Regular rate and rhythm; No murmurs, rubs, or gallops  ABDOMEN: Soft, lower abdominal tenderness, Nondistended; Bowel sounds present  EXTREMITIES:  2+ Peripheral Pulses, No clubbing, cyanosis, or edema  PSYCH: AAOx3  NEUROLOGY: non-focal  SKIN: No rashes or lesions    LABS:              8.4                  133  | 27   | 9            7.97  >-----------< 394     ------------------------< 100                   28.0                 4.5  | 93   | 0.64                                         Ca 9.2   Mg 1.9   Ph 3.3      MICROBIOLOGY:    RADIOLOGY & ADDITIONAL TESTS:    Imaging Personally Reviewed:    [ ] Consultant(s) Notes Reviewed:  [x ] Care Discussed with Consultants/Other Providers: ads jt Milligan, CT abd/pelvis with contrast

## 2019-01-27 ENCOUNTER — TRANSCRIPTION ENCOUNTER (OUTPATIENT)
Age: 69
End: 2019-01-27

## 2019-01-27 LAB
ANION GAP SERPL CALC-SCNC: 13 MMO/L — SIGNIFICANT CHANGE UP (ref 7–14)
BUN SERPL-MCNC: 3 MG/DL — LOW (ref 7–23)
CALCIUM SERPL-MCNC: 9.6 MG/DL — SIGNIFICANT CHANGE UP (ref 8.4–10.5)
CHLORIDE SERPL-SCNC: 97 MMOL/L — LOW (ref 98–107)
CO2 SERPL-SCNC: 27 MMOL/L — SIGNIFICANT CHANGE UP (ref 22–31)
CREAT SERPL-MCNC: 0.57 MG/DL — SIGNIFICANT CHANGE UP (ref 0.5–1.3)
GLUCOSE SERPL-MCNC: 99 MG/DL — SIGNIFICANT CHANGE UP (ref 70–99)
HCT VFR BLD CALC: 32.7 % — LOW (ref 34.5–45)
HGB BLD-MCNC: 9.6 G/DL — LOW (ref 11.5–15.5)
MCHC RBC-ENTMCNC: 24.5 PG — LOW (ref 27–34)
MCHC RBC-ENTMCNC: 29.4 % — LOW (ref 32–36)
MCV RBC AUTO: 83.4 FL — SIGNIFICANT CHANGE UP (ref 80–100)
NRBC # FLD: 0 K/UL — LOW (ref 25–125)
PLATELET # BLD AUTO: 491 K/UL — HIGH (ref 150–400)
PMV BLD: 9.2 FL — SIGNIFICANT CHANGE UP (ref 7–13)
POTASSIUM SERPL-MCNC: 3.6 MMOL/L — SIGNIFICANT CHANGE UP (ref 3.5–5.3)
POTASSIUM SERPL-SCNC: 3.6 MMOL/L — SIGNIFICANT CHANGE UP (ref 3.5–5.3)
RBC # BLD: 3.92 M/UL — SIGNIFICANT CHANGE UP (ref 3.8–5.2)
RBC # FLD: 15 % — HIGH (ref 10.3–14.5)
SODIUM SERPL-SCNC: 137 MMOL/L — SIGNIFICANT CHANGE UP (ref 135–145)
WBC # BLD: 7.45 K/UL — SIGNIFICANT CHANGE UP (ref 3.8–10.5)
WBC # FLD AUTO: 7.45 K/UL — SIGNIFICANT CHANGE UP (ref 3.8–10.5)

## 2019-01-27 PROCEDURE — 74177 CT ABD & PELVIS W/CONTRAST: CPT | Mod: 26

## 2019-01-27 PROCEDURE — 99233 SBSQ HOSP IP/OBS HIGH 50: CPT

## 2019-01-27 RX ORDER — KETOROLAC TROMETHAMINE 30 MG/ML
15 SYRINGE (ML) INJECTION EVERY 6 HOURS
Qty: 0 | Refills: 0 | Status: DISCONTINUED | OUTPATIENT
Start: 2019-01-27 | End: 2019-01-31

## 2019-01-27 RX ADMIN — HYDROMORPHONE HYDROCHLORIDE 30 MILLILITER(S): 2 INJECTION INTRAMUSCULAR; INTRAVENOUS; SUBCUTANEOUS at 19:54

## 2019-01-27 RX ADMIN — HYDROMORPHONE HYDROCHLORIDE 4 MILLIGRAM(S): 2 INJECTION INTRAMUSCULAR; INTRAVENOUS; SUBCUTANEOUS at 14:55

## 2019-01-27 RX ADMIN — Medication 15 MILLIGRAM(S): at 13:30

## 2019-01-27 RX ADMIN — HYDROMORPHONE HYDROCHLORIDE 30 MILLILITER(S): 2 INJECTION INTRAMUSCULAR; INTRAVENOUS; SUBCUTANEOUS at 07:33

## 2019-01-27 RX ADMIN — Medication 2 MILLIGRAM(S): at 00:23

## 2019-01-27 RX ADMIN — GABAPENTIN 1200 MILLIGRAM(S): 400 CAPSULE ORAL at 21:46

## 2019-01-27 RX ADMIN — SENNA PLUS 2 TABLET(S): 8.6 TABLET ORAL at 21:45

## 2019-01-27 RX ADMIN — Medication 10 MILLIGRAM(S): at 21:45

## 2019-01-27 RX ADMIN — GABAPENTIN 1200 MILLIGRAM(S): 400 CAPSULE ORAL at 01:07

## 2019-01-27 RX ADMIN — Medication 15 MILLIGRAM(S): at 21:46

## 2019-01-27 RX ADMIN — SODIUM CHLORIDE 60 MILLILITER(S): 9 INJECTION INTRAMUSCULAR; INTRAVENOUS; SUBCUTANEOUS at 07:33

## 2019-01-27 RX ADMIN — Medication 15 MILLIGRAM(S): at 13:01

## 2019-01-27 RX ADMIN — Medication 15 MILLIGRAM(S): at 22:10

## 2019-01-27 RX ADMIN — Medication 2 MILLIGRAM(S): at 21:46

## 2019-01-27 RX ADMIN — HYDROMORPHONE HYDROCHLORIDE 30 MILLILITER(S): 2 INJECTION INTRAMUSCULAR; INTRAVENOUS; SUBCUTANEOUS at 09:57

## 2019-01-27 RX ADMIN — HYDROMORPHONE HYDROCHLORIDE 4 MILLIGRAM(S): 2 INJECTION INTRAMUSCULAR; INTRAVENOUS; SUBCUTANEOUS at 14:34

## 2019-01-27 NOTE — PROGRESS NOTE ADULT - PROBLEM SELECTOR PLAN 1
- increased pain not controlled with PO morphine ER and PO hydromorphone q4 outpatient,   -PCA adjusted, now on continuous 2mg/hr, demand 2mg IV q15min PRN, 4 h lockout 40 mg, pain management per palliative care  -add toradol 15 mg q6h prn x1 day for pain control  - Pt has tried methadone in the past and didn't like the side effects of nausea   -pt has been accepted to hospice at [place] when she completes her RT  - Outpt palliative MD Dr. Levine also aware of hospital course

## 2019-01-27 NOTE — DISCHARGE NOTE ADULT - CARE PROVIDER_API CALL
Anne Marie Mcneil), [facility]; Internal Medicine  [address] [place] [address]  Phone: [phone]  Fax: [phone]

## 2019-01-27 NOTE — DISCHARGE NOTE ADULT - PLAN OF CARE
remain free of pain Please follow up with the medical doctors at [facility].  Please continue pain regimen as prescribed. Continue PCA as prescribed You completed 5 sessions of radiation.Please follow up with the medical doctors at [facility].  Please continue pain regimen as prescribed. Continue PCA as prescribed remain free of anxiety Please follow up with the medical doctors at [facility].  Please continue current regimen as prescribed. You completed 5 sessions of radiation. Please follow up with the medical doctors at [facility].  Please continue pain regimen as prescribed. Continue PCA as prescribed Please follow up with the medical doctors at [facility].  Please continue pain regimen as prescribed. Continue PCA as prescribed. Follow up with Dr. Brad Thompson outpatient for further recommendations.

## 2019-01-27 NOTE — PROGRESS NOTE ADULT - SUBJECTIVE AND OBJECTIVE BOX
Donita Mayen MD  Pager [phone]    CHIEF COMPLAINT: Patient is a 68y old  female who presents with a chief complaint of uncontrolled pain (26 Jan 2019 11:41)      SUBJECTIVE / OVERNIGHT EVENTS:  pt still c/o suboptimal pain control, pca adjusted by palliative care    MEDICATIONS  (STANDING):  bisacodyl 10 milliGRAM(s) Oral at bedtime  bisacodyl Suppository 10 milliGRAM(s) Rectal daily  docusate sodium 100 milliGRAM(s) Oral two times a day  gabapentin 1200 milliGRAM(s) Oral at bedtime  HYDROmorphone PCA (5 mG/mL) 30 milliLiter(s) PCA Continuous PCA Continuous  lactulose Syrup 10 Gram(s) Oral every 6 hours  polyethylene glycol 3350 17 Gram(s) Oral two times a day  senna 2 Tablet(s) Oral at bedtime  sodium chloride 0.9%. 1000 milliLiter(s) (60 mL/Hr) IV Continuous <Continuous>    MEDICATIONS  (PRN):  artificial tears (preservative free) Ophthalmic Solution 1 Drop(s) Both EYES two times a day PRN Dry Eyes  clonazePAM Tablet 2 milliGRAM(s) Oral two times a day PRN anxiety  HYDROmorphone  Injectable 4 milliGRAM(s) IV Push every 2 hours PRN breakthrough pain  ketorolac   Injectable 15 milliGRAM(s) IV Push every 6 hours PRN moderate to severe pain  naloxone Injectable 0.1 milliGRAM(s) IV Push every 3 minutes PRN For ANY of the following changes in patient status:  A. RR LESS THAN 10 breaths per minute, B. Oxygen saturation LESS THAN 90%, C. Sedation score of 6  ondansetron Injectable 4 milliGRAM(s) IV Push every 6 hours PRN Nausea      VITALS:  T(F): 98.1 (01-27-19 @ 07:33), Max: 98.3 (01-26-19 @ 20:03)  HR: 89 (01-27-19 @ 07:33) (84 - 93)  BP: 136/67 (01-27-19 @ 07:33) (117/61 - 144/69)  RR: 17 (01-27-19 @ 07:33) (17 - 18)  SpO2: 100% (01-27-19 @ 07:33)      CAPILLARY BLOOD GLUCOSE    Output     I&O's Summary  T(F): 98.1 (01-27-19 @ 07:33), Max: 98.3 (01-26-19 @ 20:03)  HR: 89 (01-27-19 @ 07:33) (84 - 93)  BP: 136/67 (01-27-19 @ 07:33) (117/61 - 144/69)  RR: 17 (01-27-19 @ 07:33) (17 - 18)  SpO2: 100% (01-27-19 @ 07:33)    PHYSICAL EXAM:  GENERAL: NAD, well-developed  HEAD:  Atraumatic, Normocephalic  EYES: EOMI, PERRLA, conjunctiva and sclera clear  NECK: Supple, No JVD  CHEST/LUNG: Clear to auscultation bilaterally; No wheeze  HEART: Regular rate and rhythm; No murmurs, rubs, or gallops  ABDOMEN: Soft, lower abdominal tenderness, Nondistended; Bowel sounds present  EXTREMITIES:  2+ Peripheral Pulses, No clubbing, cyanosis, or edema  PSYCH: AAOx3  NEUROLOGY: non-focal  SKIN: No rashes or lesions    LABS:              9.6                  137  | 27   | 3            7.45  >-----------< 491     ------------------------< 99                    32.7                 3.6  | 97   | 0.57                                         Ca 9.6   Mg x     Ph x              MICROBIOLOGY:    RADIOLOGY & ADDITIONAL TESTS:    Imaging Personally Reviewed:    [ ] Consultant(s) Notes Reviewed:  [x ] Care Discussed with Consultants/Other Providers: ads jt Milligan, expedite CT, adjust pca per palliative care

## 2019-01-27 NOTE — DISCHARGE NOTE ADULT - MEDICATION SUMMARY - MEDICATIONS TO TAKE
I will START or STAY ON the medications listed below when I get home from the hospital:    ketorolac  -- 15 milligram(s) intravenous every 6 hours, As Needed for moderate to severe pain MDD: 4   -- Indication: For Neoplasm related pain    HYDROmorphone  -- PCA volume of 30 milliliter(s) by intravenous PCA. Initial demand dose 2mg, lockout 15 minutes, continuous rate 2mg/hr, four hour limit of 40 mg.   -- Indication: For Neoplasm related pain    HYDROmorphone 1 mg/mL-NaCl 0.9% injectable solution  -- 4 milligram(s) injectable every 2 hours prn breakthrough pain MDD:12  -- Caution federal law prohibits the transfer of this drug to any person other  than the person for whom it was prescribed.  This prescription cannot be refilled.    -- Indication: For Neoplasm related pain    gabapentin 600 mg oral tablet  -- 2 tab(s) by mouth once a day (at bedtime)  -- Indication: For Neoplasm related pain    clonazePAM 2 mg oral tablet  -- 1 tab(s) by mouth 2 times a day, As Needed  -- Indication: For Palliative care encounter    naloxone  -- 0.1 milligram(s) intravenous every 3 hours, As Needed For ANY of the following changes in patient status:  A. RR LESS THAN 10 breaths per minute, B. Oxygen saturation LESS THAN 90%, C. Sedation score of 6  -- Indication: For Neoplasm related pain    ondansetron 2 mg/mL injectable solution  -- 4 milligram(s) intravenous every 6 hours, As Needed nausea  -- Indication: For Other chronic pain    Multiple Vitamins with Iron oral tablet  -- 1 tab(s) by mouth once a day  -- Indication: For supplement    Dulcolax Laxative 5 mg oral delayed release tablet  -- 1 tab(s) by mouth once a day, As Needed  -- Indication: For bowel regimen     Senna 8.6 mg oral tablet  -- 2 tab(s) by mouth once a day (at bedtime), As Needed  -- Indication: For bowel regimen     bisacodyl 10 mg rectal suppository  -- 1 suppository(ies) rectally once a day, As Needed for constipation   -- Indication: For bowel regimen     docusate sodium 100 mg oral capsule  -- 1 cap(s) by mouth 2 times a day  -- Indication: For bowel regimen     lactulose 10 g/15 mL oral syrup  -- 10 milliliter(s) by mouth every 6 hours hold for loose stool  -- Indication: For bowel regimen     polyethylene glycol 3350 oral powder for reconstitution  -- 17 gram(s) by mouth 2 times a day  -- Indication: For bowel regimen     Artificial Tears ophthalmic solution  -- 1 drop(s) to each affected eye 2 times a day, As Needed  -- Indication: For Eye drops

## 2019-01-27 NOTE — DISCHARGE NOTE ADULT - HOSPITAL COURSE
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    Neoplasm related pain  - follows with Dr Abe Thompson outpatient  - c/w home bowel regimen - senna and lactulose  - Palliative consulted - on Morphine, uptitrating Gabapentin to 300 TID  - no relief with Dilaudid IV or Oxy started PCA Dilaudid by Palliative  - Istop reference #[id number]     Endocervical adenocarcinoma  - MRI in 11/2018 confirming progression of disease  - Onc consulted  - Per Onc pt will need more tissue Bx to see if eligible for immunotherapy. Pt refused  - Pt does not want further disease modifying treatment and would like Hospice  - Rad/onc Dr Huynh -disease is no longer curable with radiation   - Plan for 5 sessions of palliative pelvic RT - completes 1/31/19  -Abd pain: ct abd and pelvis: Large cervical mass extending into the endometrial canal and   uterus which has markedly increased in size since 5/17/2017 and   consistent with patient's known endocervical cancer.  New pelvic and retroperitoneal adenopathy, consistent with metastatic   disease.  No acute bowel pathology.    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    Neoplasm related pain  - follows with Dr Abe Thompson outpatient  - c/w home bowel regimen - senna and lactulose  - Palliative consulted - on Morphine, uptitrating Gabapentin to 300 TID  - no relief with Dilaudid IV or Oxy started PCA Dilaudid by Palliative  - Istop reference #[id number]     Endocervical adenocarcinoma  - MRI in 11/2018 confirming progression of disease  - Onc consulted  - Per Onc pt will need more tissue Bx to see if eligible for immunotherapy. Pt refused  - Pt does not want further disease modifying treatment and would like Hospice  - Rad/onc Dr Huynh -disease is no longer curable with radiation   - Plan for 5 sessions of palliative pelvic RT - completes 1/31/19  -Abd pain: ct abd and pelvis: Large cervical mass extending into the endometrial canal and   uterus which has markedly increased in size since 5/17/2017 and   consistent with patient's known endocervical cancer.  New pelvic and retroperitoneal adenopathy, consistent with metastatic   disease.  No acute bowel pathology.    Anxiety  - started Clonazepam 2mg BID  - last script sent 1/7 and filled 1/17- Istop reference #[id number]    Goals of care  - GOC discussed outpatient with Dr Abe Thompson and inpatient with Palliative Care Team  - ELAN in chart - DNR/DNI, no IVF, determine use/limitation for ab if infection occurs, no feeding tube, do not send to hospital unless pain or severe symptoms cannot be otherwise controlled, comfort measures only. Stable for d/c to [facility].

## 2019-01-27 NOTE — DISCHARGE NOTE ADULT - CARE PLAN
Principal Discharge DX:	Neoplasm related pain  Goal:	remain free of pain  Assessment and plan of treatment:	Please follow up with the medical doctors at [facility].  Please continue pain regimen as prescribed. Continue PCA as prescribed  Secondary Diagnosis:	Endocervical adenocarcinoma  Goal:	remain free of pain  Assessment and plan of treatment:	You completed 5 sessions of radiation.Please follow up with the medical doctors at [facility].  Please continue pain regimen as prescribed. Continue PCA as prescribed  Secondary Diagnosis:	Anxiety  Goal:	remain free of anxiety  Assessment and plan of treatment:	Please follow up with the medical doctors at [facility].  Please continue current regimen as prescribed. Principal Discharge DX:	Neoplasm related pain  Goal:	remain free of pain  Assessment and plan of treatment:	Please follow up with the medical doctors at [facility].  Please continue pain regimen as prescribed. Continue PCA as prescribed  Secondary Diagnosis:	Endocervical adenocarcinoma  Goal:	remain free of pain  Assessment and plan of treatment:	You completed 5 sessions of radiation. Please follow up with the medical doctors at [facility].  Please continue pain regimen as prescribed. Continue PCA as prescribed  Secondary Diagnosis:	Anxiety  Goal:	remain free of anxiety  Assessment and plan of treatment:	Please follow up with the medical doctors at [facility].  Please continue current regimen as prescribed. Principal Discharge DX:	Neoplasm related pain  Goal:	remain free of pain  Assessment and plan of treatment:	Please follow up with the medical doctors at [facility].  Please continue pain regimen as prescribed. Continue PCA as prescribed. Follow up with Dr. Brad Thompson outpatient for further recommendations.  Secondary Diagnosis:	Endocervical adenocarcinoma  Goal:	remain free of pain  Assessment and plan of treatment:	You completed 5 sessions of radiation. Please follow up with the medical doctors at [facility].  Please continue pain regimen as prescribed. Continue PCA as prescribed  Secondary Diagnosis:	Anxiety  Goal:	remain free of anxiety  Assessment and plan of treatment:	Please follow up with the medical doctors at [facility].  Please continue current regimen as prescribed.

## 2019-01-27 NOTE — PROGRESS NOTE ADULT - PROBLEM SELECTOR PLAN 3
- prescribed clonazepam 2mg BID - per patient takes it when needed however has been taking it more at night to help with sleep  - will c/w 2mg BID prn  - last script sent 1/7 and filled 1/17  Istop reference #[id number]

## 2019-01-27 NOTE — DISCHARGE NOTE ADULT - COMMUNITY RESOURCES
[facility] address: [address] [place] [address], tel # [phone], 1:30pm  [phone] with [facility] PCA pump. [facility] ambulance will return equipment to the hospital after transport

## 2019-01-27 NOTE — DISCHARGE NOTE ADULT - PATIENT PORTAL LINK FT
You can access the [facility] Portal, offered by [facility], by registering with the following website: [email or web address]

## 2019-01-27 NOTE — PROGRESS NOTE ADULT - PROBLEM SELECTOR PLAN 2
-not resectable per pt, never had chemo/RT, pt was seen by obgyn Dr. Rianna Gray in past  -expedite contrast CT abd/pelvis to assess disease progression  -case d/w rad/onc Dr. Huynh, plan for palliative RT x5 sessions, refused RT yesterday, RT #1/5, plan for RT #2 on Monday, expect to complete RT next Thur  -oncology consult appreciated, pt declined immunotherapy at this time, outpt f/u if she changes her mind  - MRI in 11/2018 confirming progression of disease  - patient not interested in chemotherapy, has been only pursuing alternative measures   -f/u [facility] and SW

## 2019-01-28 LAB
ANION GAP SERPL CALC-SCNC: 12 MMO/L — SIGNIFICANT CHANGE UP (ref 7–14)
BUN SERPL-MCNC: 5 MG/DL — LOW (ref 7–23)
CALCIUM SERPL-MCNC: 9.3 MG/DL — SIGNIFICANT CHANGE UP (ref 8.4–10.5)
CHLORIDE SERPL-SCNC: 96 MMOL/L — LOW (ref 98–107)
CO2 SERPL-SCNC: 28 MMOL/L — SIGNIFICANT CHANGE UP (ref 22–31)
CREAT SERPL-MCNC: 0.66 MG/DL — SIGNIFICANT CHANGE UP (ref 0.5–1.3)
GLUCOSE SERPL-MCNC: 88 MG/DL — SIGNIFICANT CHANGE UP (ref 70–99)
HCT VFR BLD CALC: 29 % — LOW (ref 34.5–45)
HGB BLD-MCNC: 8.6 G/DL — LOW (ref 11.5–15.5)
MCHC RBC-ENTMCNC: 24.9 PG — LOW (ref 27–34)
MCHC RBC-ENTMCNC: 29.7 % — LOW (ref 32–36)
MCV RBC AUTO: 84.1 FL — SIGNIFICANT CHANGE UP (ref 80–100)
NRBC # FLD: 0 K/UL — LOW (ref 25–125)
PLATELET # BLD AUTO: 390 K/UL — SIGNIFICANT CHANGE UP (ref 150–400)
PMV BLD: 9 FL — SIGNIFICANT CHANGE UP (ref 7–13)
POTASSIUM SERPL-MCNC: 4 MMOL/L — SIGNIFICANT CHANGE UP (ref 3.5–5.3)
POTASSIUM SERPL-SCNC: 4 MMOL/L — SIGNIFICANT CHANGE UP (ref 3.5–5.3)
RBC # BLD: 3.45 M/UL — LOW (ref 3.8–5.2)
RBC # FLD: 15 % — HIGH (ref 10.3–14.5)
SODIUM SERPL-SCNC: 136 MMOL/L — SIGNIFICANT CHANGE UP (ref 135–145)
WBC # BLD: 5 K/UL — SIGNIFICANT CHANGE UP (ref 3.8–10.5)
WBC # FLD AUTO: 5 K/UL — SIGNIFICANT CHANGE UP (ref 3.8–10.5)

## 2019-01-28 PROCEDURE — 99233 SBSQ HOSP IP/OBS HIGH 50: CPT

## 2019-01-28 RX ADMIN — Medication 15 MILLIGRAM(S): at 08:14

## 2019-01-28 RX ADMIN — Medication 2 MILLIGRAM(S): at 23:31

## 2019-01-28 RX ADMIN — Medication 15 MILLIGRAM(S): at 08:30

## 2019-01-28 RX ADMIN — Medication 100 MILLIGRAM(S): at 17:06

## 2019-01-28 RX ADMIN — POLYETHYLENE GLYCOL 3350 17 GRAM(S): 17 POWDER, FOR SOLUTION ORAL at 17:07

## 2019-01-28 RX ADMIN — GABAPENTIN 1200 MILLIGRAM(S): 400 CAPSULE ORAL at 23:30

## 2019-01-28 RX ADMIN — HYDROMORPHONE HYDROCHLORIDE 30 MILLILITER(S): 2 INJECTION INTRAMUSCULAR; INTRAVENOUS; SUBCUTANEOUS at 07:39

## 2019-01-28 RX ADMIN — Medication 10 MILLIGRAM(S): at 23:30

## 2019-01-28 RX ADMIN — POLYETHYLENE GLYCOL 3350 17 GRAM(S): 17 POWDER, FOR SOLUTION ORAL at 06:52

## 2019-01-28 RX ADMIN — HYDROMORPHONE HYDROCHLORIDE 4 MILLIGRAM(S): 2 INJECTION INTRAMUSCULAR; INTRAVENOUS; SUBCUTANEOUS at 13:30

## 2019-01-28 RX ADMIN — HYDROMORPHONE HYDROCHLORIDE 30 MILLILITER(S): 2 INJECTION INTRAMUSCULAR; INTRAVENOUS; SUBCUTANEOUS at 19:14

## 2019-01-28 RX ADMIN — LACTULOSE 10 GRAM(S): 10 SOLUTION ORAL at 06:53

## 2019-01-28 RX ADMIN — Medication 100 MILLIGRAM(S): at 06:52

## 2019-01-28 RX ADMIN — Medication 10 MILLIGRAM(S): at 17:06

## 2019-01-28 RX ADMIN — LACTULOSE 10 GRAM(S): 10 SOLUTION ORAL at 12:23

## 2019-01-28 RX ADMIN — HYDROMORPHONE HYDROCHLORIDE 4 MILLIGRAM(S): 2 INJECTION INTRAMUSCULAR; INTRAVENOUS; SUBCUTANEOUS at 13:08

## 2019-01-28 RX ADMIN — SODIUM CHLORIDE 60 MILLILITER(S): 9 INJECTION INTRAMUSCULAR; INTRAVENOUS; SUBCUTANEOUS at 07:42

## 2019-01-28 RX ADMIN — LACTULOSE 10 GRAM(S): 10 SOLUTION ORAL at 17:06

## 2019-01-28 RX ADMIN — LACTULOSE 10 GRAM(S): 10 SOLUTION ORAL at 23:31

## 2019-01-28 RX ADMIN — HYDROMORPHONE HYDROCHLORIDE 30 MILLILITER(S): 2 INJECTION INTRAMUSCULAR; INTRAVENOUS; SUBCUTANEOUS at 14:11

## 2019-01-28 RX ADMIN — SENNA PLUS 2 TABLET(S): 8.6 TABLET ORAL at 23:30

## 2019-01-28 NOTE — PROGRESS NOTE ADULT - PROBLEM SELECTOR PLAN 1
- Cont with dilaudid PCA adjusted, f/u with palliative care for pain management  -cont toradol   - Pt has tried methadone in the past and didn't like the side effects of nausea   -pt has been accepted to hospice at [facility] when she completes her RT  - Outpt palliative MD Dr. Levine also aware of hospital course, now agreeable for [facility].

## 2019-01-28 NOTE — CHART NOTE - NSCHARTNOTEFT_GEN_A_CORE
Ms Isaac today inquired about "curative" treatment, but firmly declined any consideration of chemotherapy- I have again told her that given the extensive pelvic/retroperitoneal adenopathy, radiation alone is extremely unlikely to be curative.  I again indicated to Ms Isaac that the extent of palliative response to radiation, and it's durability, are impossible to reliably to predict. I recommended, that if she would like, to re-discuss with Medical Oncology and Gyn Oncology possible "curative" treatment options, and also indicated that should she wish to change treatment goals in that regard, I would recommend stopping radiation until she has made a final determination as to the goals of treatment, in conjunction with myself , the Palliative Care service, Medical Oncology and Gyn Oncology  Mitul Huynh MD  [phone]

## 2019-01-28 NOTE — CHART NOTE - NSCHARTNOTEFT_GEN_A_CORE
Ms Isaac  this morning asked about the possibility of "curative" radiation, but again firmly declined to consider chemotherapy, as per my initial interview with her. Given the extensive adenopathy, I think cure, especially with radiation alone, is very unlikely. I recommended that she again discuss systemic treatment options with Med. Onc. if she wishes to consider more aggressive treatment options. She is also concerned about the extent, quality and durability of her pain relief from palliative radiation. I have told Ms Isaac that the positive results of palliative radiation are not predictable, either in terms of response or durability. She could also discuss with Gyn Onc.    Mitul Huynh MD  [phone]

## 2019-01-28 NOTE — PROGRESS NOTE ADULT - SUBJECTIVE AND OBJECTIVE BOX
Patient is a 68y old  Female who presents with a chief complaint of uncontrolled pain (27 Jan 2019 19:10)      SUBJECTIVE / OVERNIGHT EVENTS: Pt seen and examined at 2pm, no overnight events, pt reports having no bm however nsg reports that she had loose stools and has good bm still wanting to take lactulose, c/o having lower abdominal pain, wants more pain meds, no other new issues.    MEDICATIONS  (STANDING):  bisacodyl 10 milliGRAM(s) Oral at bedtime  bisacodyl Suppository 10 milliGRAM(s) Rectal daily  docusate sodium 100 milliGRAM(s) Oral two times a day  gabapentin 1200 milliGRAM(s) Oral at bedtime  HYDROmorphone PCA (5 mG/mL) 30 milliLiter(s) PCA Continuous PCA Continuous  lactulose Syrup 10 Gram(s) Oral every 6 hours  polyethylene glycol 3350 17 Gram(s) Oral two times a day  senna 2 Tablet(s) Oral at bedtime  sodium chloride 0.9%. 1000 milliLiter(s) (60 mL/Hr) IV Continuous <Continuous>    MEDICATIONS  (PRN):  artificial tears (preservative free) Ophthalmic Solution 1 Drop(s) Both EYES two times a day PRN Dry Eyes  clonazePAM Tablet 2 milliGRAM(s) Oral two times a day PRN anxiety  HYDROmorphone  Injectable 4 milliGRAM(s) IV Push every 2 hours PRN breakthrough pain  ketorolac   Injectable 15 milliGRAM(s) IV Push every 6 hours PRN moderate to severe pain  naloxone Injectable 0.1 milliGRAM(s) IV Push every 3 minutes PRN For ANY of the following changes in patient status:  A. RR LESS THAN 10 breaths per minute, B. Oxygen saturation LESS THAN 90%, C. Sedation score of 6  ondansetron Injectable 4 milliGRAM(s) IV Push every 6 hours PRN Nausea      Vital Signs Last 24 Hrs  T(C): 36.7 (28 Jan 2019 16:00), Max: 36.9 (28 Jan 2019 04:00)  T(F): 98 (28 Jan 2019 16:00), Max: 98.4 (28 Jan 2019 04:00)  HR: 80 (28 Jan 2019 16:00) (78 - 88)  BP: 115/60 (28 Jan 2019 16:00) (115/60 - 139/63)  BP(mean): --  RR: 18 (28 Jan 2019 16:00) (16 - 18)  SpO2: 98% (28 Jan 2019 16:00) (98% - 100%)  CAPILLARY BLOOD GLUCOSE        I&O's Summary      PHYSICAL EXAM:  GENERAL: NAD, well-developed  CHEST/LUNG: Clear to auscultation bilaterally; No wheeze  HEART: Regular rate and rhythm; No murmurs  ABDOMEN: Soft, lower abdominal tenderness, Nondistended  EXTREMITIES: no LE edema  PSYCH: calm  NEUROLOGY: AAOx3  SKIN: No rashes or lesions    LABS:                        8.6    5.00  )-----------( 390      ( 28 Jan 2019 05:48 )             29.0     01-28    136  |  96<L>  |  5<L>  ----------------------------<  88  4.0   |  28  |  0.66    Ca    9.3      28 Jan 2019 05:48                RADIOLOGY & ADDITIONAL TESTS:    Imaging Personally Reviewed:    Consultant(s) Notes Reviewed:      Care Discussed with Consultants/Other Providers: Patient is a 68y old  Female who presents with a chief complaint of uncontrolled pain (27 Jan 2019 19:10)      SUBJECTIVE / OVERNIGHT EVENTS: Pt seen and examined at 2pm, no overnight events, pt reports having no bm however nsg reports that she had loose stools and has good bm still wanting to take lactulose, c/o having lower abdominal pain, wants more pain meds, no other new issues.    MEDICATIONS  (STANDING):  bisacodyl 10 milliGRAM(s) Oral at bedtime  bisacodyl Suppository 10 milliGRAM(s) Rectal daily  docusate sodium 100 milliGRAM(s) Oral two times a day  gabapentin 1200 milliGRAM(s) Oral at bedtime  HYDROmorphone PCA (5 mG/mL) 30 milliLiter(s) PCA Continuous PCA Continuous  lactulose Syrup 10 Gram(s) Oral every 6 hours  polyethylene glycol 3350 17 Gram(s) Oral two times a day  senna 2 Tablet(s) Oral at bedtime  sodium chloride 0.9%. 1000 milliLiter(s) (60 mL/Hr) IV Continuous <Continuous>    MEDICATIONS  (PRN):  artificial tears (preservative free) Ophthalmic Solution 1 Drop(s) Both EYES two times a day PRN Dry Eyes  clonazePAM Tablet 2 milliGRAM(s) Oral two times a day PRN anxiety  HYDROmorphone  Injectable 4 milliGRAM(s) IV Push every 2 hours PRN breakthrough pain  ketorolac   Injectable 15 milliGRAM(s) IV Push every 6 hours PRN moderate to severe pain  naloxone Injectable 0.1 milliGRAM(s) IV Push every 3 minutes PRN For ANY of the following changes in patient status:  A. RR LESS THAN 10 breaths per minute, B. Oxygen saturation LESS THAN 90%, C. Sedation score of 6  ondansetron Injectable 4 milliGRAM(s) IV Push every 6 hours PRN Nausea      Vital Signs Last 24 Hrs  T(C): 36.7 (28 Jan 2019 16:00), Max: 36.9 (28 Jan 2019 04:00)  T(F): 98 (28 Jan 2019 16:00), Max: 98.4 (28 Jan 2019 04:00)  HR: 80 (28 Jan 2019 16:00) (78 - 88)  BP: 115/60 (28 Jan 2019 16:00) (115/60 - 139/63)  BP(mean): --  RR: 18 (28 Jan 2019 16:00) (16 - 18)  SpO2: 98% (28 Jan 2019 16:00) (98% - 100%)  CAPILLARY BLOOD GLUCOSE        I&O's Summary      PHYSICAL EXAM:  GENERAL: NAD, well-developed  CHEST/LUNG: Clear to auscultation bilaterally; No wheeze  HEART: Regular rate and rhythm; No murmurs  ABDOMEN: Soft, lower abdominal tenderness, Nondistended  EXTREMITIES: no LE edema  PSYCH: calm  NEUROLOGY: AAOx3  SKIN: No rashes or lesions    LABS:                        8.6    5.00  )-----------( 390      ( 28 Jan 2019 05:48 )             29.0     01-28    136  |  96<L>  |  5<L>  ----------------------------<  88  4.0   |  28  |  0.66    Ca    9.3      28 Jan 2019 05:48                RADIOLOGY & ADDITIONAL TESTS:  < from: CT Abdomen and Pelvis w/ Oral Cont and w/ IV Cont (01.27.19 @ 15:16) >  C< from: CT Abdomen and Pelvis w/ Oral Cont and w/ IV Cont (01.27.19 @ 15:16) >  Large cervical mass extending into the endometrial canal and   uterus which has markedly increased in size since 5/17/2017 and   consistent with patient's known endocervical cancer.    New pelvic and retroperitoneal adenopathy, consistent with metastatic   disease.    No acute bowel pathology.    < end of copied text >  T ABDOMEN AND PELVIS OC IC      < end of copied text >    Imaging Personally Reviewed:    Consultant(s) Notes Reviewed:      Care Discussed with Consultants/Other Providers:

## 2019-01-28 NOTE — PROGRESS NOTE ADULT - PROBLEM SELECTOR PLAN 3
- prescribed clonazepam 2mg BID - per patient takes it when needed however has been taking it more at night to help with sleep  - will c/w 2mg BID prn  - last script sent 1/7 and filled 1/17  Istop reference #[id number]

## 2019-01-28 NOTE — PROGRESS NOTE ADULT - PROBLEM SELECTOR PLAN 2
Plan for 5 sessions of palliative RT - completes 1/30/19 - Patient states she does not want further disease modifying treatment and would like hospice.  Goal is [place] post completion.  Continue supportive care.

## 2019-01-28 NOTE — PROGRESS NOTE ADULT - PROBLEM SELECTOR PLAN 1
- Continue Dilaudid PCA - 5mg/ml, continuous 2mg, demand 2mg, q15min.  Using PCA appropriately.  Patient would benefit from PICC line given long term PCA usage due to high pain medication requirements.  Endorsed to team.  Bowel regimen.

## 2019-01-28 NOTE — PROGRESS NOTE ADULT - SUBJECTIVE AND OBJECTIVE BOX
INTERVAL HPI/OVERNIGHT EVENTS: Pain overall improved.     Code Status: DNR  Allergies    No Known Allergies    Intolerances    MEDICATIONS  (STANDING):  bisacodyl 10 milliGRAM(s) Oral at bedtime  bisacodyl Suppository 10 milliGRAM(s) Rectal once  gabapentin 1200 milliGRAM(s) Oral at bedtime  HYDROmorphone PCA (5 mG/mL) 30 milliLiter(s) PCA Continuous PCA Continuous  lactulose Syrup 10 Gram(s) Oral every 6 hours  polyethylene glycol 3350 17 Gram(s) Oral two times a day  sodium chloride 0.9%. 1000 milliLiter(s) (30 mL/Hr) IV Continuous <Continuous>    MEDICATIONS  (PRN):  artificial tears (preservative free) Ophthalmic Solution 1 Drop(s) Both EYES two times a day PRN Dry Eyes  clonazePAM Tablet 2 milliGRAM(s) Oral two times a day PRN anxiety  naloxone Injectable 0.1 milliGRAM(s) IV Push every 3 minutes PRN For ANY of the following changes in patient status:  A. RR LESS THAN 10 breaths per minute, B. Oxygen saturation LESS THAN 90%, C. Sedation score of 6  ondansetron Injectable 4 milliGRAM(s) IV Push every 6 hours PRN Nausea      PRESENT SYMPTOMS: [ ]Unable to obtain due to poor mentation   Source if other than patient:  [ ]Family   [ ]Team     Pain (Impact on QOL):  Currently 5/10 - lower abdomen/pelvic area - sharp - aggravated by movement and relieved with opioids     Dyspnea:  Yes [ ] No [x ] - [ ]Mild [ ]Moderate [ ]Severe  Anxiety:    Yes [ x] No [ ] - [ x]Mild [ ]Moderate [ ]Severe  Fatigue:    Yes [x ] No [ ] - [x ]Mild [ ]Moderate [ ]Severe  Nausea:    Yes [ ] No [ x] - [ ]Mild [ ]Moderate [ ]Severe                         Loss of appetite: Yes [ ] No [x ] - [ ]Mild [ ]Moderate [ ]Severe             Constipation:  Yes [ x] No [ ] - [ ]Mild [ ]Moderate [ ]Severe    PAIN AD Score:	  [email or web address]/painad.[email or web address] (Ctrl + left click to view)    Other Symptoms:  [x ]All other review of systems negative     Karnofsky Performance Score/Palliative Performance Status Version 2:   50-60%    http://palliative.info/resource_material/[email or web address].pdf    PHYSICAL EXAM:  Vital Signs Last 24 Hrs  T(C): 37.2 (24 Jan 2019 12:19), Max: 37.2 (24 Jan 2019 12:19)  T(F): 99 (24 Jan 2019 12:19), Max: 99 (24 Jan 2019 12:19)  HR: 85 (24 Jan 2019 12:19) (76 - 98)  BP: 107/53 (24 Jan 2019 12:19) (107/53 - 135/82)  BP(mean): --  RR: 18 (24 Jan 2019 12:19) (17 - 18)  SpO2: 99% (24 Jan 2019 12:19) (96% - 99%) I&O's Summary     GENERAL:  [x ]Alert  [x ]Oriented x 4  [ ]Lethargic  [ ]Cachexia  [ ]Unarousable  [x ]Verbal  [ ]Non-Verbal  PULMONARY:   [x ]Clear - anteriorly    [ ]Rhonchi        [ ]Right [ ]Left [ ]Bilateral  [ ]Crackles        [ ]Right [ ]Left [ ]Bilateral  [ ]Wheezing     [ ]Right [ ]Left [ ]Bilateral  CARDIOVASCULAR:    [x ]Regular [ ]Irregular [ ]Tachy  [ ]Dex [ ]Murmur [ ]Other  GASTROINTESTINAL:  [x ]Soft  [ ]Distended   [x ]+BS  [x ]Non tender [ ]Tender  [ ]PEG [ ]OGT/ NGT   Last BM:  1/22/19  GENITOURINARY:  [x ]Normal [ ] Incontinent   [ ]Oliguria/Anuria   [ ]Otto  MUSCULOSKELETAL:   [ ]Normal   [ x]Weakness  [ ]Bed/Wheelchair bound [ ]Edema  NEUROLOGIC:   [x ]No focal deficits  [ ] Cognitive impairment  [ ] Dysphagia [ ]Dysarthria [ ] Paresis [ ]Other   SKIN:   [x ]Normal   [ ]Pressure ulcer(s)  [ ]Rash    CRITICAL CARE:  [ ] Shock Present  [ ]Septic [ ]Cardiogenic [ ]Neurologic [ ]Hypovolemic  [ ]  Vasopressors [ ]  Inotropes   [ ] Respiratory failure present  [ ] Acute  [ ] Chronic [ ] Hypoxic  [ ] Hypercarbic [ ] Other  [ ] Other organ failure     LABS:                        8.9    6.21  )-----------( 398      ( 24 Jan 2019 05:10 )             30.4   01-24    137  |  98  |  4<L>  ----------------------------<  100<H>  4.7   |  30  |  0.71    Ca    9.4      24 Jan 2019 05:10          RADIOLOGY & ADDITIONAL STUDIES:    Protein Calorie Malnutrition Present: [ ] yes [ ] no  [ ] PPSV2 < or = 30%  [ ] significant weight loss [ ] poor nutritional intake [ ] anasarca [ ] catabolic state Albumin, Serum: 4.0 g/dL (01-18-19 @ 17:53)      REFERRALS:   [ ]Chaplaincy  [ ] Hospice  [ ]Child Life  [ ]Social Work  [ ]Case management [ ]Holistic Therapy   Goals of Care Document:

## 2019-01-28 NOTE — PROGRESS NOTE ADULT - PROBLEM SELECTOR PLAN 2
-not resectable per pt, never had chemo/RT, pt was seen by obgyn Dr. Rianna Gray in past  -case d/w rad/onc Dr. Huynh by prior attending, plan for palliative RT x5 sessions,  plan for RT #2 today, expect to complete RT next Thur  -oncology consult appreciated, pt declined immunotherapy at this time, outpt f/u if she changes her mind  - MRI in 11/2018 confirming progression of disease  - patient not interested in chemotherapy, has been only pursuing alternative measures   -f/u [place] hospice and SW -not resectable per pt, never had chemo/RT, pt was seen by obgyn Dr. Rianna Gray in past  ct abd/pelvis revealed Large cervical mass extending into the endometrial canal and uterus which has markedly increased in size since 5/17/2017 and   consistent with patient's known endocervical cancer.  New pelvic and retroperitoneal adenopathy, consistent with metastatic   disease.  -case d/w rad/onc Dr. Huynh by prior attending, plan for palliative RT x5 sessions,  plan for RT #2 today, expect to complete RT next Thur  -oncology consult appreciated, pt declined immunotherapy at this time, outpt f/u if she changes her mind  - MRI in 11/2018 confirming progression of disease  - patient not interested in chemotherapy, has been only pursuing alternative measures   -f/u [place] hospice and SW

## 2019-01-29 PROCEDURE — 99233 SBSQ HOSP IP/OBS HIGH 50: CPT

## 2019-01-29 RX ADMIN — Medication 15 MILLIGRAM(S): at 07:26

## 2019-01-29 RX ADMIN — LACTULOSE 10 GRAM(S): 10 SOLUTION ORAL at 17:08

## 2019-01-29 RX ADMIN — SODIUM CHLORIDE 60 MILLILITER(S): 9 INJECTION INTRAMUSCULAR; INTRAVENOUS; SUBCUTANEOUS at 19:27

## 2019-01-29 RX ADMIN — Medication 15 MILLIGRAM(S): at 22:38

## 2019-01-29 RX ADMIN — Medication 100 MILLIGRAM(S): at 06:19

## 2019-01-29 RX ADMIN — LACTULOSE 10 GRAM(S): 10 SOLUTION ORAL at 11:51

## 2019-01-29 RX ADMIN — POLYETHYLENE GLYCOL 3350 17 GRAM(S): 17 POWDER, FOR SOLUTION ORAL at 17:08

## 2019-01-29 RX ADMIN — Medication 15 MILLIGRAM(S): at 22:28

## 2019-01-29 RX ADMIN — LACTULOSE 10 GRAM(S): 10 SOLUTION ORAL at 06:19

## 2019-01-29 RX ADMIN — Medication 2 MILLIGRAM(S): at 22:27

## 2019-01-29 RX ADMIN — SODIUM CHLORIDE 60 MILLILITER(S): 9 INJECTION INTRAMUSCULAR; INTRAVENOUS; SUBCUTANEOUS at 07:27

## 2019-01-29 RX ADMIN — Medication 15 MILLIGRAM(S): at 13:28

## 2019-01-29 RX ADMIN — Medication 15 MILLIGRAM(S): at 13:45

## 2019-01-29 RX ADMIN — GABAPENTIN 1200 MILLIGRAM(S): 400 CAPSULE ORAL at 22:28

## 2019-01-29 RX ADMIN — Medication 100 MILLIGRAM(S): at 17:08

## 2019-01-29 RX ADMIN — POLYETHYLENE GLYCOL 3350 17 GRAM(S): 17 POWDER, FOR SOLUTION ORAL at 06:19

## 2019-01-29 RX ADMIN — Medication 15 MILLIGRAM(S): at 00:10

## 2019-01-29 RX ADMIN — HYDROMORPHONE HYDROCHLORIDE 30 MILLILITER(S): 2 INJECTION INTRAMUSCULAR; INTRAVENOUS; SUBCUTANEOUS at 07:27

## 2019-01-29 RX ADMIN — HYDROMORPHONE HYDROCHLORIDE 30 MILLILITER(S): 2 INJECTION INTRAMUSCULAR; INTRAVENOUS; SUBCUTANEOUS at 19:28

## 2019-01-29 RX ADMIN — Medication 15 MILLIGRAM(S): at 01:00

## 2019-01-29 RX ADMIN — Medication 15 MILLIGRAM(S): at 07:35

## 2019-01-29 NOTE — PROGRESS NOTE ADULT - PROBLEM SELECTOR PLAN 2
-not resectable per pt, never had chemo/RT, pt was seen by obgyn Dr. Rianna Gray in past  ct abd/pelvis revealed Large cervical mass extending into the endometrial canal and uterus which has markedly increased in size since 5/17/2017 and   consistent with patient's known endocervical cancer.  New pelvic and retroperitoneal adenopathy, consistent with metastatic   disease.  -case d/w rad/onc Dr. Huynh by prior attending, plan for palliative RT x5 sessions,  plan for RT #3 today, expect to complete RT next Thur  -oncology consult appreciated, pt declined immunotherapy at this time, outpt f/u if she changes her mind  - MRI in 11/2018 confirming progression of disease  - patient not interested in chemotherapy, has been only pursuing alternative measures   -f/u [place] hospice and SW

## 2019-01-29 NOTE — PROGRESS NOTE ADULT - SUBJECTIVE AND OBJECTIVE BOX
INTERVAL HPI/OVERNIGHT EVENTS: Pain overall improved.     Code Status: DNR  Allergies    No Known Allergies    Intolerances    MEDICATIONS  (STANDING):  bisacodyl 10 milliGRAM(s) Oral at bedtime  bisacodyl Suppository 10 milliGRAM(s) Rectal once  gabapentin 1200 milliGRAM(s) Oral at bedtime  HYDROmorphone PCA (5 mG/mL) 30 milliLiter(s) PCA Continuous PCA Continuous  lactulose Syrup 10 Gram(s) Oral every 6 hours  polyethylene glycol 3350 17 Gram(s) Oral two times a day  sodium chloride 0.9%. 1000 milliLiter(s) (30 mL/Hr) IV Continuous <Continuous>    MEDICATIONS  (PRN):  artificial tears (preservative free) Ophthalmic Solution 1 Drop(s) Both EYES two times a day PRN Dry Eyes  clonazePAM Tablet 2 milliGRAM(s) Oral two times a day PRN anxiety  naloxone Injectable 0.1 milliGRAM(s) IV Push every 3 minutes PRN For ANY of the following changes in patient status:  A. RR LESS THAN 10 breaths per minute, B. Oxygen saturation LESS THAN 90%, C. Sedation score of 6  ondansetron Injectable 4 milliGRAM(s) IV Push every 6 hours PRN Nausea      PRESENT SYMPTOMS: [ ]Unable to obtain due to poor mentation   Source if other than patient:  [ ]Family   [ ]Team     Pain (Impact on QOL):  Currently 5/10 - lower abdomen/pelvic area - sharp - aggravated by movement and relieved with opioids     Dyspnea:  Yes [ ] No [x ] - [ ]Mild [ ]Moderate [ ]Severe  Anxiety:    Yes [ x] No [ ] - [ x]Mild [ ]Moderate [ ]Severe  Fatigue:    Yes [x ] No [ ] - [x ]Mild [ ]Moderate [ ]Severe  Nausea:    Yes [ ] No [ x] - [ ]Mild [ ]Moderate [ ]Severe                         Loss of appetite: Yes [ ] No [x ] - [ ]Mild [ ]Moderate [ ]Severe             Constipation:  Yes [ x] No [ ] - [ ]Mild [ ]Moderate [ ]Severe    PAIN AD Score:	  [email or web address]/[email or web address] (Ctrl + left click to view)    Other Symptoms:  [x ]All other review of systems negative     Karnofsky Performance Score/Palliative Performance Status Version 2:   50-60%    http://palliative.info/resource_material/[email or web address].pdf    PHYSICAL EXAM:  Vital Signs Last 24 Hrs  T(C): 37.2 (24 Jan 2019 12:19), Max: 37.2 (24 Jan 2019 12:19)  T(F): 99 (24 Jan 2019 12:19), Max: 99 (24 Jan 2019 12:19)  HR: 85 (24 Jan 2019 12:19) (76 - 98)  BP: 107/53 (24 Jan 2019 12:19) (107/53 - 135/82)  BP(mean): --  RR: 18 (24 Jan 2019 12:19) (17 - 18)  SpO2: 99% (24 Jan 2019 12:19) (96% - 99%) I&O's Summary     GENERAL:  [x ]Alert  [x ]Oriented x 4  [ ]Lethargic  [ ]Cachexia  [ ]Unarousable  [x ]Verbal  [ ]Non-Verbal  PULMONARY:   [x ]Clear - anteriorly    [ ]Rhonchi        [ ]Right [ ]Left [ ]Bilateral  [ ]Crackles        [ ]Right [ ]Left [ ]Bilateral  [ ]Wheezing     [ ]Right [ ]Left [ ]Bilateral  CARDIOVASCULAR:    [x ]Regular [ ]Irregular [ ]Tachy  [ ]Dex [ ]Murmur [ ]Other  GASTROINTESTINAL:  [x ]Soft  [ ]Distended   [x ]+BS  [x ]Non tender [ ]Tender  [ ]PEG [ ]OGT/ NGT   Last BM:  1/22/19  GENITOURINARY:  [x ]Normal [ ] Incontinent   [ ]Oliguria/Anuria   [ ]Otto  MUSCULOSKELETAL:   [ ]Normal   [ x]Weakness  [ ]Bed/Wheelchair bound [ ]Edema  NEUROLOGIC:   [x ]No focal deficits  [ ] Cognitive impairment  [ ] Dysphagia [ ]Dysarthria [ ] Paresis [ ]Other   SKIN:   [x ]Normal   [ ]Pressure ulcer(s)  [ ]Rash    CRITICAL CARE:  [ ] Shock Present  [ ]Septic [ ]Cardiogenic [ ]Neurologic [ ]Hypovolemic  [ ]  Vasopressors [ ]  Inotropes   [ ] Respiratory failure present  [ ] Acute  [ ] Chronic [ ] Hypoxic  [ ] Hypercarbic [ ] Other  [ ] Other organ failure     LABS:                        8.9    6.21  )-----------( 398      ( 24 Jan 2019 05:10 )             30.4   01-24    137  |  98  |  4<L>  ----------------------------<  100<H>  4.7   |  30  |  0.71    Ca    9.4      24 Jan 2019 05:10          RADIOLOGY & ADDITIONAL STUDIES:    Protein Calorie Malnutrition Present: [ ] yes [ ] no  [ ] PPSV2 < or = 30%  [ ] significant weight loss [ ] poor nutritional intake [ ] anasarca [ ] catabolic state Albumin, Serum: 4.0 g/dL (01-18-19 @ 17:53)      REFERRALS:   [ ]Chaplaincy  [ ] Hospice  [ ]Child Life  [ ]Social Work  [ ]Case management [ ]Holistic Therapy   Goals of Care Document:

## 2019-01-29 NOTE — PROGRESS NOTE ADULT - PROBLEM SELECTOR PLAN 1
- Continue Dilaudid PCA - 5mg/ml, continuous 2mg, demand 2mg, q15min.  Using PCA appropriately.    - Given amount of opiates that the patient is requiring, transition to any other opiate is unrealistic.   - Patient requires current dose to stay comfortable.  - Patient inquiring about places of discharge other than [facility]. She was informed that at this time, inpatient hospice is not an option due to her mobility, no issues with PO access.  - Home with hospice was unlikely due to poor support from family and that nursing homes do not take patients with PCA's.

## 2019-01-29 NOTE — PROGRESS NOTE ADULT - SUBJECTIVE AND OBJECTIVE BOX
Patient is a 68y old  Female who presents with a chief complaint of uncontrolled pain (28 Jan 2019 17:34)      SUBJECTIVE / OVERNIGHT EVENTS: Pt seen and examined at 11:45am, no overnight events, pt reports that her abdominal pain is better controlled today with current pain meds, does not want to go [place] wants to go home with po pain meds, no other new issues. Got RT today.        MEDICATIONS  (STANDING):  bisacodyl 10 milliGRAM(s) Oral at bedtime  bisacodyl Suppository 10 milliGRAM(s) Rectal daily  docusate sodium 100 milliGRAM(s) Oral two times a day  gabapentin 1200 milliGRAM(s) Oral at bedtime  HYDROmorphone PCA (5 mG/mL) 30 milliLiter(s) PCA Continuous PCA Continuous  lactulose Syrup 10 Gram(s) Oral every 6 hours  polyethylene glycol 3350 17 Gram(s) Oral two times a day  senna 2 Tablet(s) Oral at bedtime  sodium chloride 0.9%. 1000 milliLiter(s) (60 mL/Hr) IV Continuous <Continuous>    MEDICATIONS  (PRN):  artificial tears (preservative free) Ophthalmic Solution 1 Drop(s) Both EYES two times a day PRN Dry Eyes  clonazePAM Tablet 2 milliGRAM(s) Oral two times a day PRN anxiety  HYDROmorphone  Injectable 4 milliGRAM(s) IV Push every 2 hours PRN breakthrough pain  ketorolac   Injectable 15 milliGRAM(s) IV Push every 6 hours PRN moderate to severe pain  naloxone Injectable 0.1 milliGRAM(s) IV Push every 3 minutes PRN For ANY of the following changes in patient status:  A. RR LESS THAN 10 breaths per minute, B. Oxygen saturation LESS THAN 90%, C. Sedation score of 6  ondansetron Injectable 4 milliGRAM(s) IV Push every 6 hours PRN Nausea      Vital Signs Last 24 Hrs  T(C): 36.7 (29 Jan 2019 11:45), Max: 36.9 (29 Jan 2019 00:00)  T(F): 98 (29 Jan 2019 11:45), Max: 98.4 (29 Jan 2019 00:00)  HR: 87 (29 Jan 2019 11:45) (79 - 87)  BP: 139/61 (29 Jan 2019 11:45) (120/67 - 139/61)  BP(mean): --  RR: 18 (29 Jan 2019 11:45) (17 - 18)  SpO2: 98% (29 Jan 2019 11:45) (96% - 98%)  CAPILLARY BLOOD GLUCOSE        I&O's Summary      PHYSICAL EXAM:  GENERAL: NAD, well-developed  CHEST/LUNG: Clear to auscultation bilaterally; No wheeze  HEART: Regular rate and rhythm; No murmurs  ABDOMEN: Soft, nontender, Nondistended  EXTREMITIES: no LE edema  PSYCH: calm  NEUROLOGY: AAOx3  SKIN: No rashes or lesions      LABS:                        8.6    5.00  )-----------( 390      ( 28 Jan 2019 05:48 )             29.0     01-28    136  |  96<L>  |  5<L>  ----------------------------<  88  4.0   |  28  |  0.66    Ca    9.3      28 Jan 2019 05:48                RADIOLOGY & ADDITIONAL TESTS:    Imaging Personally Reviewed:    Consultant(s) Notes Reviewed:      Care Discussed with Consultants/Other Providers:

## 2019-01-29 NOTE — PROGRESS NOTE ADULT - PROBLEM SELECTOR PLAN 1
- Cont with dilaudid PCA, discussed with palliative care pt is on high doses and cannot be switched to po meds  -cont toradol   - Pt has tried methadone in the past and didn't like the side effects of nausea   -pt has been accepted to hospice at [facility] when she completes her RT  - Outpt palliative MD Dr. Levine also aware of hospital course, not agreeable to [facility] wants to go home with po meds which is not feasible given that pt is using high dose of dilaudid.

## 2019-01-29 NOTE — PROGRESS NOTE ADULT - PROBLEM SELECTOR PLAN 3
- prescribed clonazepam 2mg BID - per patient takes it when needed however has been taking it more at night to help with sleep  - will c/w 2mg BID prn  - last script sent 1/7 and filled 1/17  [facility] reference #[id number]

## 2019-01-29 NOTE — PROGRESS NOTE ADULT - PROBLEM SELECTOR PLAN 2
Plan for 5 sessions of palliative RT - completes 1/30/19 - Patient states she does not want further disease modifying treatment and would like hospice.  Goal is [place] post completion.  Continue supportive care.

## 2019-01-30 PROCEDURE — 99233 SBSQ HOSP IP/OBS HIGH 50: CPT

## 2019-01-30 RX ADMIN — HYDROMORPHONE HYDROCHLORIDE 4 MILLIGRAM(S): 2 INJECTION INTRAMUSCULAR; INTRAVENOUS; SUBCUTANEOUS at 13:19

## 2019-01-30 RX ADMIN — ONDANSETRON 4 MILLIGRAM(S): 8 TABLET, FILM COATED ORAL at 23:53

## 2019-01-30 RX ADMIN — HYDROMORPHONE HYDROCHLORIDE 30 MILLILITER(S): 2 INJECTION INTRAMUSCULAR; INTRAVENOUS; SUBCUTANEOUS at 19:22

## 2019-01-30 RX ADMIN — Medication 15 MILLIGRAM(S): at 14:01

## 2019-01-30 RX ADMIN — Medication 15 MILLIGRAM(S): at 22:20

## 2019-01-30 RX ADMIN — HYDROMORPHONE HYDROCHLORIDE 30 MILLILITER(S): 2 INJECTION INTRAMUSCULAR; INTRAVENOUS; SUBCUTANEOUS at 07:41

## 2019-01-30 RX ADMIN — Medication 15 MILLIGRAM(S): at 07:49

## 2019-01-30 RX ADMIN — Medication 15 MILLIGRAM(S): at 13:46

## 2019-01-30 RX ADMIN — Medication 15 MILLIGRAM(S): at 22:08

## 2019-01-30 RX ADMIN — Medication 2 MILLIGRAM(S): at 22:07

## 2019-01-30 RX ADMIN — Medication 15 MILLIGRAM(S): at 07:34

## 2019-01-30 RX ADMIN — SODIUM CHLORIDE 60 MILLILITER(S): 9 INJECTION INTRAMUSCULAR; INTRAVENOUS; SUBCUTANEOUS at 13:23

## 2019-01-30 RX ADMIN — HYDROMORPHONE HYDROCHLORIDE 4 MILLIGRAM(S): 2 INJECTION INTRAMUSCULAR; INTRAVENOUS; SUBCUTANEOUS at 13:23

## 2019-01-30 RX ADMIN — GABAPENTIN 1200 MILLIGRAM(S): 400 CAPSULE ORAL at 22:08

## 2019-01-30 RX ADMIN — HYDROMORPHONE HYDROCHLORIDE 30 MILLILITER(S): 2 INJECTION INTRAMUSCULAR; INTRAVENOUS; SUBCUTANEOUS at 16:27

## 2019-01-30 NOTE — PROGRESS NOTE ADULT - PROBLEM SELECTOR PLAN 2
-not resectable per pt, never had chemo/RT, pt was seen by obgyn Dr. Rianna Gray in past  ct abd/pelvis revealed Large cervical mass extending into the endometrial canal and uterus which has markedly increased in size since 5/17/2017 and   consistent with patient's known endocervical cancer.  New pelvic and retroperitoneal adenopathy, consistent with metastatic   disease.  -case d/w rad/onc Dr. Huynh by prior attending, plan for palliative RT x5 sessions,  plan for RT #3 today, expect to complete RT next Thur  -oncology consult appreciated, pt declined immunotherapy at this time, outpt f/u if she changes her mind  - MRI in 11/2018 confirming progression of disease  - patient not interested in chemotherapy, has been only pursuing alternative measures   -f/u [place] hospice and SW

## 2019-01-30 NOTE — PROGRESS NOTE ADULT - SUBJECTIVE AND OBJECTIVE BOX
Patient is a 68y old  Female who presents with a chief complaint of uncontrolled pain (29 Jan 2019 16:44)      SUBJECTIVE / OVERNIGHT EVENTS: Pt seen and examined at 12:10pm, no overnight events, pt reports that her abdominal pain is better controlled today with current pain meds, discussed with pt along with WILLIS BERGERON and with her friend Gin over the phone (who is a nurse) about her limited options, after extensive discussion agreed to go to [facility] tomorrow after RT is completed. Got RT today. No other new issues reported.        MEDICATIONS  (STANDING):  bisacodyl 10 milliGRAM(s) Oral at bedtime  bisacodyl Suppository 10 milliGRAM(s) Rectal daily  docusate sodium 100 milliGRAM(s) Oral two times a day  gabapentin 1200 milliGRAM(s) Oral at bedtime  HYDROmorphone PCA (5 mG/mL) 30 milliLiter(s) PCA Continuous PCA Continuous  lactulose Syrup 10 Gram(s) Oral every 6 hours  polyethylene glycol 3350 17 Gram(s) Oral two times a day  senna 2 Tablet(s) Oral at bedtime  sodium chloride 0.9%. 1000 milliLiter(s) (60 mL/Hr) IV Continuous <Continuous>    MEDICATIONS  (PRN):  artificial tears (preservative free) Ophthalmic Solution 1 Drop(s) Both EYES two times a day PRN Dry Eyes  clonazePAM Tablet 2 milliGRAM(s) Oral two times a day PRN anxiety  HYDROmorphone  Injectable 4 milliGRAM(s) IV Push every 2 hours PRN breakthrough pain  ketorolac   Injectable 15 milliGRAM(s) IV Push every 6 hours PRN moderate to severe pain  naloxone Injectable 0.1 milliGRAM(s) IV Push every 3 minutes PRN For ANY of the following changes in patient status:  A. RR LESS THAN 10 breaths per minute, B. Oxygen saturation LESS THAN 90%, C. Sedation score of 6  ondansetron Injectable 4 milliGRAM(s) IV Push every 6 hours PRN Nausea      Vital Signs Last 24 Hrs  T(C): 36.7 (30 Jan 2019 15:42), Max: 37.1 (29 Jan 2019 19:20)  T(F): 98 (30 Jan 2019 15:42), Max: 98.7 (29 Jan 2019 19:20)  HR: 90 (30 Jan 2019 15:42) (72 - 90)  BP: 131/70 (30 Jan 2019 15:42) (118/68 - 151/75)  BP(mean): --  RR: 17 (30 Jan 2019 15:42) (17 - 18)  SpO2: 100% (30 Jan 2019 15:42) (97% - 100%)  CAPILLARY BLOOD GLUCOSE        I&O's Summary      PHYSICAL EXAM:  GENERAL: NAD, well-developed  CHEST/LUNG: Clear to auscultation bilaterally; No wheeze  HEART: Regular rate and rhythm; No murmurs  ABDOMEN: Soft, nontender, Nondistended  EXTREMITIES: no LE edema  PSYCH: calm  NEUROLOGY: AAOx3  SKIN: No rashes or lesions    LABS:                    RADIOLOGY & ADDITIONAL TESTS:    Imaging Personally Reviewed:    Consultant(s) Notes Reviewed:      Care Discussed with Consultants/Other Providers:

## 2019-01-30 NOTE — PROGRESS NOTE ADULT - PROBLEM SELECTOR PLAN 3
- prescribed clonazepam 2mg BID - per patient takes it when needed however has been taking it more at night to help with sleep  - will c/w 2mg BID prn  - last script sent 1/7 and filled 1/17  [facility] reference #[id number]

## 2019-01-30 NOTE — PROGRESS NOTE ADULT - PROBLEM SELECTOR PLAN 1
- Cont with dilaudid PCA at current dose  -cont toradol   - Pt has tried methadone in the past and didn't like the side effects of nausea   -pt has been accepted to hospice at [facility] when she completes her RT  - Outpt palliative MD Dr. Levine also aware of hospital course,  Discussed with pt along with RUBIO, WILLIS and with her friend Gin over the phone (who is a nurse) about her limited options, after extensive discussion agreed to go to [facility] tomorrow after RT is completed. D/C planning for tomorrow.

## 2019-01-31 VITALS
HEART RATE: 81 BPM | SYSTOLIC BLOOD PRESSURE: 132 MMHG | OXYGEN SATURATION: 100 % | DIASTOLIC BLOOD PRESSURE: 66 MMHG | RESPIRATION RATE: 17 BRPM | TEMPERATURE: 98 F

## 2019-01-31 PROCEDURE — 99233 SBSQ HOSP IP/OBS HIGH 50: CPT

## 2019-01-31 PROCEDURE — 99239 HOSP IP/OBS DSCHRG MGMT >30: CPT

## 2019-01-31 RX ORDER — GABAPENTIN 400 MG/1
2 CAPSULE ORAL
Qty: 0 | Refills: 0 | COMMUNITY
Start: 2019-01-31

## 2019-01-31 RX ORDER — HYDROMORPHONE HYDROCHLORIDE 2 MG/ML
30 INJECTION INTRAMUSCULAR; INTRAVENOUS; SUBCUTANEOUS
Qty: 0 | Refills: 0 | COMMUNITY
Start: 2019-01-31

## 2019-01-31 RX ORDER — LACTULOSE 10 G/15ML
10 SOLUTION ORAL
Qty: 0 | Refills: 0 | COMMUNITY

## 2019-01-31 RX ORDER — NALOXONE HYDROCHLORIDE 4 MG/.1ML
0.1 SPRAY NASAL
Qty: 0 | Refills: 0 | COMMUNITY
Start: 2019-01-31

## 2019-01-31 RX ORDER — POLYETHYLENE GLYCOL 3350 17 G/17G
17 POWDER, FOR SOLUTION ORAL
Qty: 0 | Refills: 0 | COMMUNITY
Start: 2019-01-31

## 2019-01-31 RX ORDER — ONDANSETRON 8 MG/1
4 TABLET, FILM COATED ORAL
Qty: 0 | Refills: 0 | COMMUNITY
Start: 2019-01-31

## 2019-01-31 RX ORDER — IBUPROFEN 200 MG
1 TABLET ORAL
Qty: 0 | Refills: 0 | COMMUNITY

## 2019-01-31 RX ORDER — KETOROLAC TROMETHAMINE 30 MG/ML
15 SYRINGE (ML) INJECTION EVERY 6 HOURS
Qty: 0 | Refills: 0 | Status: DISCONTINUED | OUTPATIENT
Start: 2019-01-31 | End: 2019-01-31

## 2019-01-31 RX ORDER — DOCUSATE SODIUM 100 MG
1 CAPSULE ORAL
Qty: 0 | Refills: 0 | COMMUNITY
Start: 2019-01-31

## 2019-01-31 RX ORDER — MORPHINE SULFATE 50 MG/1
2 CAPSULE, EXTENDED RELEASE ORAL
Qty: 0 | Refills: 0 | COMMUNITY

## 2019-01-31 RX ORDER — CLONAZEPAM 1 MG
2 TABLET ORAL
Qty: 0 | Refills: 0 | Status: DISCONTINUED | OUTPATIENT
Start: 2019-01-31 | End: 2019-01-31

## 2019-01-31 RX ORDER — HYDROMORPHONE HYDROCHLORIDE 2 MG/ML
1 INJECTION INTRAMUSCULAR; INTRAVENOUS; SUBCUTANEOUS
Qty: 0 | Refills: 0 | COMMUNITY

## 2019-01-31 RX ORDER — HYDROMORPHONE HYDROCHLORIDE 2 MG/ML
4 INJECTION INTRAMUSCULAR; INTRAVENOUS; SUBCUTANEOUS
Qty: 0 | Refills: 0 | Status: DISCONTINUED | OUTPATIENT
Start: 2019-01-31 | End: 2019-01-31

## 2019-01-31 RX ORDER — METOCLOPRAMIDE HCL 10 MG
1 TABLET ORAL
Qty: 0 | Refills: 0 | COMMUNITY

## 2019-01-31 RX ORDER — LACTULOSE 10 G/15ML
15 SOLUTION ORAL
Qty: 0 | Refills: 0 | COMMUNITY

## 2019-01-31 RX ORDER — HYDROMORPHONE HYDROCHLORIDE 2 MG/ML
4 INJECTION INTRAMUSCULAR; INTRAVENOUS; SUBCUTANEOUS
Qty: 336 | Refills: 0 | OUTPATIENT
Start: 2019-01-31 | End: 2019-02-06

## 2019-01-31 RX ORDER — KETOROLAC TROMETHAMINE 30 MG/ML
15 SYRINGE (ML) INJECTION
Qty: 0 | Refills: 0 | COMMUNITY
Start: 2019-01-31

## 2019-01-31 RX ADMIN — HYDROMORPHONE HYDROCHLORIDE 4 MILLIGRAM(S): 2 INJECTION INTRAMUSCULAR; INTRAVENOUS; SUBCUTANEOUS at 03:32

## 2019-01-31 RX ADMIN — HYDROMORPHONE HYDROCHLORIDE 30 MILLILITER(S): 2 INJECTION INTRAMUSCULAR; INTRAVENOUS; SUBCUTANEOUS at 07:31

## 2019-01-31 RX ADMIN — Medication 15 MILLIGRAM(S): at 12:51

## 2019-01-31 RX ADMIN — HYDROMORPHONE HYDROCHLORIDE 4 MILLIGRAM(S): 2 INJECTION INTRAMUSCULAR; INTRAVENOUS; SUBCUTANEOUS at 03:22

## 2019-01-31 RX ADMIN — Medication 15 MILLIGRAM(S): at 12:36

## 2019-01-31 RX ADMIN — LACTULOSE 10 GRAM(S): 10 SOLUTION ORAL at 12:36

## 2019-01-31 NOTE — PROGRESS NOTE ADULT - PROBLEM SELECTOR PROBLEM 3
Anxiety
Constipation
Anxiety

## 2019-01-31 NOTE — PROGRESS NOTE ADULT - REASON FOR ADMISSION
uncontrolled pain

## 2019-01-31 NOTE — PROGRESS NOTE ADULT - PROBLEM SELECTOR PROBLEM 2
Endocervical adenocarcinoma

## 2019-01-31 NOTE — PROGRESS NOTE ADULT - PROBLEM SELECTOR PROBLEM 1
Neoplasm related pain
Pain, neoplasm-related
Neoplasm related pain

## 2019-01-31 NOTE — PROGRESS NOTE ADULT - SUBJECTIVE AND OBJECTIVE BOX
Patient is a 68y old  Female who presents with a chief complaint of uncontrolled pain (30 Jan 2019 16:49)      SUBJECTIVE / OVERNIGHT EVENTS: Pt seen and examined at 12:05pm, no overnight events, pt reports that her abdominal pain is better controlled today with current pain meds, reluctant to go to [facility] after speaking again is convinced and is agreeable to go to [place]. Got RT today. No other new issues reported.        MEDICATIONS  (STANDING):  bisacodyl 10 milliGRAM(s) Oral at bedtime  bisacodyl Suppository 10 milliGRAM(s) Rectal daily  docusate sodium 100 milliGRAM(s) Oral two times a day  gabapentin 1200 milliGRAM(s) Oral at bedtime  HYDROmorphone PCA (5 mG/mL) 30 milliLiter(s) PCA Continuous PCA Continuous  lactulose Syrup 10 Gram(s) Oral every 6 hours  polyethylene glycol 3350 17 Gram(s) Oral two times a day  senna 2 Tablet(s) Oral at bedtime  sodium chloride 0.9%. 1000 milliLiter(s) (60 mL/Hr) IV Continuous <Continuous>    MEDICATIONS  (PRN):  artificial tears (preservative free) Ophthalmic Solution 1 Drop(s) Both EYES two times a day PRN Dry Eyes  clonazePAM Tablet 2 milliGRAM(s) Oral two times a day PRN anxiety  HYDROmorphone  Injectable 4 milliGRAM(s) IV Push every 2 hours PRN breakthrough pain  ketorolac   Injectable 15 milliGRAM(s) IV Push every 6 hours PRN moderate to severe pain  naloxone Injectable 0.1 milliGRAM(s) IV Push every 3 minutes PRN For ANY of the following changes in patient status:  A. RR LESS THAN 10 breaths per minute, B. Oxygen saturation LESS THAN 90%, C. Sedation score of 6  ondansetron Injectable 4 milliGRAM(s) IV Push every 6 hours PRN Nausea      Vital Signs Last 24 Hrs  T(C): 36.4 (31 Jan 2019 11:52), Max: 36.8 (30 Jan 2019 19:20)  T(F): 97.5 (31 Jan 2019 11:52), Max: 98.2 (30 Jan 2019 19:20)  HR: 81 (31 Jan 2019 11:52) (70 - 82)  BP: 132/66 (31 Jan 2019 11:52) (105/55 - 135/70)  BP(mean): --  RR: 17 (31 Jan 2019 11:52) (16 - 17)  SpO2: 100% (31 Jan 2019 11:52) (95% - 100%)  CAPILLARY BLOOD GLUCOSE        I&O's Summary      PHYSICAL EXAM:  GENERAL: NAD, well-developed  CHEST/LUNG: Clear to auscultation bilaterally; No wheeze  HEART: Regular rate and rhythm; No murmurs  ABDOMEN: Soft, nontender, Nondistended  EXTREMITIES: no LE edema  PSYCH: calm  NEUROLOGY: AAOx3  SKIN: No rashes or lesions      LABS:                    RADIOLOGY & ADDITIONAL TESTS:    Imaging Personally Reviewed:    Consultant(s) Notes Reviewed:      Care Discussed with Consultants/Other Providers:

## 2019-01-31 NOTE — PROGRESS NOTE ADULT - PROBLEM SELECTOR PROBLEM 4
Anxiety
Goals of care, counseling/discussion

## 2019-01-31 NOTE — PROGRESS NOTE ADULT - SUBJECTIVE AND OBJECTIVE BOX
INTERVAL HPI/OVERNIGHT EVENTS: Pain overall improved.     Code Status: DNR  Allergies    No Known Allergies    Intolerances    MEDICATIONS  (STANDING):  bisacodyl 10 milliGRAM(s) Oral at bedtime  bisacodyl Suppository 10 milliGRAM(s) Rectal once  gabapentin 1200 milliGRAM(s) Oral at bedtime  HYDROmorphone PCA (5 mG/mL) 30 milliLiter(s) PCA Continuous PCA Continuous  lactulose Syrup 10 Gram(s) Oral every 6 hours  polyethylene glycol 3350 17 Gram(s) Oral two times a day  sodium chloride 0.9%. 1000 milliLiter(s) (30 mL/Hr) IV Continuous <Continuous>    MEDICATIONS  (PRN):  artificial tears (preservative free) Ophthalmic Solution 1 Drop(s) Both EYES two times a day PRN Dry Eyes  clonazePAM Tablet 2 milliGRAM(s) Oral two times a day PRN anxiety  naloxone Injectable 0.1 milliGRAM(s) IV Push every 3 minutes PRN For ANY of the following changes in patient status:  A. RR LESS THAN 10 breaths per minute, B. Oxygen saturation LESS THAN 90%, C. Sedation score of 6  ondansetron Injectable 4 milliGRAM(s) IV Push every 6 hours PRN Nausea      PRESENT SYMPTOMS: [ ]Unable to obtain due to poor mentation   Source if other than patient:  [ ]Family   [ ]Team     Pain (Impact on QOL):  Currently 5/10 - lower abdomen/pelvic area - sharp - aggravated by movement and relieved with opioids     Dyspnea:  Yes [ ] No [x ] - [ ]Mild [ ]Moderate [ ]Severe  Anxiety:    Yes [ x] No [ ] - [ x]Mild [ ]Moderate [ ]Severe  Fatigue:    Yes [x ] No [ ] - [x ]Mild [ ]Moderate [ ]Severe  Nausea:    Yes [ ] No [ x] - [ ]Mild [ ]Moderate [ ]Severe                         Loss of appetite: Yes [ ] No [x ] - [ ]Mild [ ]Moderate [ ]Severe             Constipation:  Yes [ x] No [ ] - [ ]Mild [ ]Moderate [ ]Severe    PAIN AD Score:	  [email or web address].[email or web address]/painad.pdf (Ctrl + left click to view)    Other Symptoms:  [x ]All other review of systems negative     Karnofsky Performance Score/Palliative Performance Status Version 2:   50-60%    http://palliative.info/resource_material/[email or web address].pdf    PHYSICAL EXAM:  Vital Signs Last 24 Hrs  T(C): 37.2 (24 Jan 2019 12:19), Max: 37.2 (24 Jan 2019 12:19)  T(F): 99 (24 Jan 2019 12:19), Max: 99 (24 Jan 2019 12:19)  HR: 85 (24 Jan 2019 12:19) (76 - 98)  BP: 107/53 (24 Jan 2019 12:19) (107/53 - 135/82)  BP(mean): --  RR: 18 (24 Jan 2019 12:19) (17 - 18)  SpO2: 99% (24 Jan 2019 12:19) (96% - 99%) I&O's Summary     GENERAL:  [x ]Alert  [x ]Oriented x 4  [ ]Lethargic  [ ]Cachexia  [ ]Unarousable  [x ]Verbal  [ ]Non-Verbal  PULMONARY:   [x ]Clear - anteriorly    [ ]Rhonchi        [ ]Right [ ]Left [ ]Bilateral  [ ]Crackles        [ ]Right [ ]Left [ ]Bilateral  [ ]Wheezing     [ ]Right [ ]Left [ ]Bilateral  CARDIOVASCULAR:    [x ]Regular [ ]Irregular [ ]Tachy  [ ]Dex [ ]Murmur [ ]Other  GASTROINTESTINAL:  [x ]Soft  [ ]Distended   [x ]+BS  [x ]Non tender [ ]Tender  [ ]PEG [ ]OGT/ NGT   Last BM:  1/22/19  GENITOURINARY:  [x ]Normal [ ] Incontinent   [ ]Oliguria/Anuria   [ ]Otto  MUSCULOSKELETAL:   [ ]Normal   [ x]Weakness  [ ]Bed/Wheelchair bound [ ]Edema  NEUROLOGIC:   [x ]No focal deficits  [ ] Cognitive impairment  [ ] Dysphagia [ ]Dysarthria [ ] Paresis [ ]Other   SKIN:   [x ]Normal   [ ]Pressure ulcer(s)  [ ]Rash    CRITICAL CARE:  [ ] Shock Present  [ ]Septic [ ]Cardiogenic [ ]Neurologic [ ]Hypovolemic  [ ]  Vasopressors [ ]  Inotropes   [ ] Respiratory failure present  [ ] Acute  [ ] Chronic [ ] Hypoxic  [ ] Hypercarbic [ ] Other  [ ] Other organ failure     LABS:                        8.9    6.21  )-----------( 398      ( 24 Jan 2019 05:10 )             30.4   01-24    137  |  98  |  4<L>  ----------------------------<  100<H>  4.7   |  30  |  0.71    Ca    9.4      24 Jan 2019 05:10          RADIOLOGY & ADDITIONAL STUDIES:    Protein Calorie Malnutrition Present: [ ] yes [ ] no  [ ] PPSV2 < or = 30%  [ ] significant weight loss [ ] poor nutritional intake [ ] anasarca [ ] catabolic state Albumin, Serum: 4.0 g/dL (01-18-19 @ 17:53)      REFERRALS:   [ ]Chaplaincy  [ ] Hospice  [ ]Child Life  [ ]Social Work  [ ]Case management [ ]Holistic Therapy   Goals of Care Document:

## 2019-01-31 NOTE — PROGRESS NOTE ADULT - PROBLEM SELECTOR PLAN 2
-not resectable per pt, never had chemo/RT, pt was seen by obgyn Dr. Rianna Gray in past  ct abd/pelvis revealed Large cervical mass extending into the endometrial canal and uterus which has markedly increased in size since 5/17/2017 and   consistent with patient's known endocervical cancer.  New pelvic and retroperitoneal adenopathy, consistent with metastatic   disease.  -case d/w rad/onc Dr. Huynh by prior attending, plan for palliative RT x5 sessions,  plan for RT #3 today, expect to complete RT next Thur  -oncology consult appreciated, pt declined immunotherapy at this time, outpt f/u if she changes her mind  - MRI in 11/2018 confirming progression of disease  - patient not interested in chemotherapy, has been only pursuing alternative measures   -f/u [facility] hospice and SW

## 2019-01-31 NOTE — PROGRESS NOTE ADULT - PROBLEM SELECTOR PLAN 1
- Cont with dilaudid PCA at current dose  -cont toradol   - Pt has tried methadone in the past and didn't like the side effects of nausea   -pt has been accepted to hospice at [facility] when she completes her RT  - Outpt palliative MD Dr. Levine also aware of hospital course,  D/C today to [facility], d/c planning time spent in coordination 50 mts ( discussion with pt, CM, SW, RN, NP, preparing d/c summary and plan)

## 2019-01-31 NOTE — PROGRESS NOTE ADULT - PROBLEM SELECTOR PLAN 4
- GOC discussed outpatient with Dr Abe Thompson and inpatient with Palliative Care Team  - ELAN in chart - DNR/DNI, no IVF, determine use/limitation for ab if infection occurs, no feeding tube, do not send to hospital unless pain or severe symptoms cannot be otherwise controlled, comfort measures only  - will need to readdress if patient wants to be re-evaluated for possibility of radiation
- GOC discussed outpatient with Dr Abe Thompson and inpatient with Palliative Care Team  - ELAN in chart - DNR/DNI, no IVF, determine use/limitation for ab if infection occurs, no feeding tube, do not send to hospital unless pain or severe symptoms cannot be otherwise controlled, comfort measures only
- GOC discussed outpatient with Dr Abe Thompson and inpatient with Palliative Care Team by prior attending  - ELAN in chart - DNR/DNI, no IVF, determine use/limitation for lab if infection occurs, no feeding tube, do not send to hospital unless pain or severe symptoms cannot be otherwise controlled, comfort measures only
Chronic anxiety - continue Klonopin 2mg PO BID PRN.  Psychosocial support provided.

## 2019-01-31 NOTE — PROGRESS NOTE ADULT - PROBLEM SELECTOR PLAN 3
- prescribed clonazepam 2mg BID - per patient takes it when needed however has been taking it more at night to help with sleep  - will c/w 2mg BID prn  - last script sent 1/7 and filled 1/17  [facility] reference #[id number]

## 2019-01-31 NOTE — PROGRESS NOTE ADULT - ASSESSMENT
68F with endocervical cancer stage IB2 on diagnosis in 5/2017 presents from home for uncontrolled cancer pain.
68F with endocervical cancer stage IB2 on diagnosis in 5/2017 presents from home for uncontrolled cancer pain.
68 year old female with Endocervical cancer Stage IB2 on diagnosis (5/2017).  Palliative consulted for symptom management.
68F with endocervical cancer stage IB2 on diagnosis in 5/2017 presents from home for uncontrolled cancer pain.

## 2019-01-31 NOTE — PROGRESS NOTE ADULT - PROVIDER SPECIALTY LIST ADULT
Hospitalist
Palliative Care
Hospitalist

## 2019-01-31 NOTE — PROGRESS NOTE ADULT - PROBLEM SELECTOR PLAN 1
- Continue Dilaudid PCA - 5mg/ml, continuous 2mg, demand 2mg, q15min.  Using PCA appropriately.    - Given amount of opiates that the patient is requiring, transition to any other opiate is unrealistic.   - Patient requires current dose to stay comfortable.  - Patient inquiring about places of discharge other than [facility]. She was informed that at this time, inpatient hospice is not an option due to her mobility, no issues with PO access.  - Home with hospice was unlikely due to poor support from family and that nursing homes do not take patients with PCA's.

## 2019-02-11 ENCOUNTER — APPOINTMENT (OUTPATIENT)
Dept: GERIATRICS | Facility: CLINIC | Age: 69
End: 2019-02-11

## 2019-03-26 ENCOUNTER — RX RENEWAL (OUTPATIENT)
Age: 69
End: 2019-03-26

## 2019-03-26 RX ORDER — FENTANYL 100 UG/H
100 PATCH, EXTENDED RELEASE TRANSDERMAL
Qty: 2 | Refills: 0 | Status: ACTIVE | COMMUNITY
Start: 2019-03-26 | End: 1900-01-01

## 2019-03-26 RX ORDER — GABAPENTIN 300 MG/1
300 CAPSULE ORAL
Qty: 180 | Refills: 0 | Status: ACTIVE | COMMUNITY
Start: 2019-03-26 | End: 1900-01-01

## 2019-04-15 ENCOUNTER — RX RENEWAL (OUTPATIENT)
Age: 69
End: 2019-04-15

## 2019-04-15 RX ORDER — FENTANYL 25 UG/H
25 PATCH, EXTENDED RELEASE TRANSDERMAL
Qty: 2 | Refills: 0 | Status: ACTIVE | COMMUNITY
Start: 2019-03-26 | End: 1900-01-01

## 2019-04-19 ENCOUNTER — RX RENEWAL (OUTPATIENT)
Age: 69
End: 2019-04-19

## 2019-04-19 RX ORDER — HYDROMORPHONE HYDROCHLORIDE 8 MG/1
8 TABLET ORAL
Qty: 224 | Refills: 0 | Status: ACTIVE | COMMUNITY
Start: 2019-01-15 | End: 1900-01-01

## 2023-02-15 NOTE — DIETITIAN INITIAL EVALUATION ADULT. - FACTORS AFF FOOD INTAKE
pain/persistent constipation Oral Minoxidil Pregnancy And Lactation Text: This medication should only be used when clearly needed if you are pregnant, attempting to become pregnant or breast feeding.

## 2023-11-30 NOTE — CONSULT NOTE ADULT - SUBJECTIVE AND OBJECTIVE BOX
[facility]  [address] [address]  [phone]      Shelley Glass :1942 MRN:[id number]    2023 Time Session Began:   Clara Damian, PHD   HPI:  68F with endocervical cancer stage IB2 on diagnosis in 5/2017 presents from home for uncontrolled cancer pain. Patient follows with Dr Abe Thompson outpatient and was recently increased to MS ER 30mg BID and transitioned from hydrocodone to hydromorphone 2mg q3-4 prn on 1/15/19. She states more recently the pain has been constant, sharp during the day 9.5/10 in her lower abdomen. She previously would only get pain at night. She also reports some nausea, no vomiting. She was on PO medications for pain control outpatient including morphine ER, hydrocodone/acetaminophen, gabapentin taken intermittently, THC, has failed methadone previously and was taking ibuprofen as well. Recently had morphine dose increased and hydrocodone switched to hydromorphone on 1/15 without improvement.    History wise, patient followed with gyn/onc in June 2017 who recommended concurrent chemo/RT as opposed to radical hysterectomy. Patient did not want chemotherapy however agreed to radiation and rad/onc referral was made. Patient did not follow up however had all records transferred to [facility] [place] opinion. Patient then decided to pursue alternative treatments with a holistic doctor including vit C infusions, glutathione, vegan diet, baking soda uterine washes, ozone therapy. Patient had an MRI done 11/5/18 which confirmed progression of disease. Patient follows with Dr Abe Thompson for pain management.    Oncology consulted as patient was asking about immunotherapy.        PAST MEDICAL & SURGICAL HISTORY:  Endocervical adenocarcinoma  Subdural Haemorrhage, Nontraumatic: 9/2010 after being started on coumadin, patient denied surgical intervention resolved on own.  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, issue with short term memory recall  OA (Osteoarthritis): hip  c spine herniations  Lumbar Disc herniations   l4 l5: since 1981  Depression: treated since  1992  Mitral Valve Regurgitation: echo 2008  Hyperlipidemia: no meds  Chronic Pain  S/P Hip Replacement: Right total hip replacement  9/2010  Plastic Surgery: to face due to facial bite  S/P Tonsillectomy: age 7      Allergies  No Known Allergies    MEDICATIONS  (STANDING):  bisacodyl 10 milliGRAM(s) Oral at bedtime  gabapentin 1200 milliGRAM(s) Oral at bedtime  HYDROmorphone PCA (5 mG/mL) 30 milliLiter(s) PCA Continuous PCA Continuous  lactulose Syrup 10 Gram(s) Oral every 6 hours  polyethylene glycol 3350 17 Gram(s) Oral two times a day  sodium chloride 0.9%. 1000 milliLiter(s) (30 mL/Hr) IV Continuous <Continuous>    MEDICATIONS  (PRN):  artificial tears (preservative free) Ophthalmic Solution 1 Drop(s) Both EYES two times a day PRN Dry Eyes  clonazePAM Tablet 2 milliGRAM(s) Oral two times a day PRN anxiety  naloxone Injectable 0.1 milliGRAM(s) IV Push every 3 minutes PRN For ANY of the following changes in patient status:  A. RR LESS THAN 10 breaths per minute, B. Oxygen saturation LESS THAN 90%, C. Sedation score of 6  ondansetron Injectable 4 milliGRAM(s) IV Push every 6 hours PRN Nausea      FAMILY HISTORY:  No pertinent family history in first degree relatives      SOCIAL HISTORY: No EtOH, no tobacco    REVIEW OF SYSTEMS:    CONSTITUTIONAL: No weakness, fevers or chills  EYES/ENT: No visual changes;  No vertigo or throat pain   NECK: No pain or stiffness  RESPIRATORY: No cough, wheezing, hemoptysis; No shortness of breath  CARDIOVASCULAR: No chest pain or palpitations  GASTROINTESTINAL: No abdominal or epigastric pain. No nausea, vomiting, or hematemesis; No diarrhea or constipation. No melena or hematochezia.  GENITOURINARY: No dysuria, frequency or hematuria  NEUROLOGICAL: No numbness or weakness  SKIN: No itching, burning, rashes, or lesions   All other review of systems is negative unless indicated above.      T(F): 98 (01-22-19 @ 12:57), Max: 99.3 (01-21-19 @ 19:21)  HR: 92 (01-22-19 @ 12:57)  BP: 129/81 (01-22-19 @ 12:57)  RR: 18 (01-22-19 @ 12:57)  SpO2: 100% (01-22-19 @ 12:57)  Wt(kg): --    GENERAL: NAD, well-developed, ambulating around room   HEAD:  Atraumatic, Normocephalic  EYES: EOMI, conjunctiva and sclera clear  NECK: Supple  CHEST/LUNG: Clear to auscultation bilaterally; No wheezes or crackles   HEART: Regular rate and rhythm; No murmurs, rubs, or gallops  ABDOMEN: Soft, Nontender, Nondistended  EXTREMITIES: No clubbing, cyanosis, or edema  NEUROLOGY: non-focal  SKIN: No rashes or lesions                          9.2    7.40  )-----------( 379      ( 22 Jan 2019 06:56 )             30.3       01-22    137  |  97<L>  |  6<L>  ----------------------------<  104<H>  4.5   |  29  |  0.64    Ca    9.5      22 Jan 2019 06:56  Phos  4.0     01-22  Mg     1.8     01-22        Phosphorus Level, Serum: 4.0 mg/dL (01-22 @ 06:56)  Magnesium, Serum: 1.8 mg/dL (01-22 @ 06:56) HPI:  68F with endocervical cancer stage IB2 on diagnosis in 5/2017 presents from home for uncontrolled cancer pain. Patient follows with Dr Abe Thompson outpatient and was recently increased to MS ER 30mg BID and transitioned from hydrocodone to hydromorphone 2mg q3-4 prn on 1/15/19. She states more recently the pain has been constant, sharp during the day 9.5/10 in her lower abdomen. She previously would only get pain at night. She also reports some nausea, no vomiting. She was on PO medications for pain control outpatient including morphine ER, hydrocodone/acetaminophen, gabapentin taken intermittently, THC, has failed methadone previously and was taking ibuprofen as well. Recently had morphine dose increased and hydrocodone switched to hydromorphone on 1/15 without improvement.    History wise, patient followed with gyn/onc in June 2017 who recommended concurrent chemo/RT as opposed to radical hysterectomy. Patient did not want chemotherapy however agreed to radiation and rad/onc referral was made. Patient did not follow up however had all records transferred to [facility] [place] opinion. Patient then decided to pursue alternative treatments with a holistic doctor including vit C infusions, glutathione, vegan diet, baking soda uterine washes, ozone therapy. Patient had an MRI done 11/5/18 which confirmed progression of disease. Patient follows with Dr Abe Thompson for pain management.    Oncology consulted as patient was asking about immunotherapy.        PAST MEDICAL & SURGICAL HISTORY:  Endocervical adenocarcinoma  Subdural Haemorrhage, Nontraumatic: 9/2010 after being started on coumadin, patient denied surgical intervention resolved on own.  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, issue with short term memory recall  OA (Osteoarthritis): hip  c spine herniations  Lumbar Disc herniations   l4 l5: since 1981  Depression: treated since  1992  Mitral Valve Regurgitation: echo 2008  Hyperlipidemia: no meds  Chronic Pain  S/P Hip Replacement: Right total hip replacement  9/2010  Plastic Surgery: to face due to facial bite  S/P Tonsillectomy: age 7      Allergies  No Known Allergies    MEDICATIONS  (STANDING):  bisacodyl 10 milliGRAM(s) Oral at bedtime  gabapentin 1200 milliGRAM(s) Oral at bedtime  HYDROmorphone PCA (5 mG/mL) 30 milliLiter(s) PCA Continuous PCA Continuous  lactulose Syrup 10 Gram(s) Oral every 6 hours  polyethylene glycol 3350 17 Gram(s) Oral two times a day  sodium chloride 0.9%. 1000 milliLiter(s) (30 mL/Hr) IV Continuous <Continuous>    MEDICATIONS  (PRN):  artificial tears (preservative free) Ophthalmic Solution 1 Drop(s) Both EYES two times a day PRN Dry Eyes  clonazePAM Tablet 2 milliGRAM(s) Oral two times a day PRN anxiety  naloxone Injectable 0.1 milliGRAM(s) IV Push every 3 minutes PRN For ANY of the following changes in patient status:  A. RR LESS THAN 10 breaths per minute, B. Oxygen saturation LESS THAN 90%, C. Sedation score of 6  ondansetron Injectable 4 milliGRAM(s) IV Push every 6 hours PRN Nausea      FAMILY HISTORY:  No pertinent family history in first degree relatives      SOCIAL HISTORY: No EtOH, no tobacco    REVIEW OF SYSTEMS:    CONSTITUTIONAL: No weakness, fevers or chills  EYES/ENT: No visual changes;  No vertigo or throat pain   NECK: No pain or stiffness  RESPIRATORY: No cough, wheezing, hemoptysis; No shortness of breath  CARDIOVASCULAR: No chest pain or palpitations  GASTROINTESTINAL: No abdominal or epigastric pain. No nausea, vomiting, or hematemesis; No diarrhea or constipation. No melena or hematochezia.  GENITOURINARY: No dysuria, frequency or hematuria  NEUROLOGICAL: No numbness or weakness  SKIN: No itching, burning, rashes, or lesions   All other review of systems is negative unless indicated above.      T(F): 98 (01-22-19 @ 12:57), Max: 99.3 (01-21-19 @ 19:21)  HR: 92 (01-22-19 @ 12:57)  BP: 129/81 (01-22-19 @ 12:57)  RR: 18 (01-22-19 @ 12:57)  SpO2: 100% (01-22-19 @ 12:57)  Wt(kg): --    GENERAL: NAD, well-developed, ambulating around room   HEAD:  Atraumatic, Normocephalic  EYES: EOMI, conjunctiva and sclera clear  NECK: Supple  CHEST/LUNG: Clear to auscultation bilaterally; No wheezes or crackles   HEART: Regular rate and rhythm; No murmurs, rubs, or gallops  ABDOMEN: Soft, Nontender, Nondistended  EXTREMITIES: No clubbing, cyanosis, or edema  NEUROLOGY: non-focal  SKIN: No rashes or lesions  musculo-skeletal: No limitation of ROM in joints.   Lymph: No cervical or axillary nodes                          9.2    7.40  )-----------( 379      ( 22 Jan 2019 06:56 )             30.3       01-22    137  |  97<L>  |  6<L>  ----------------------------<  104<H>  4.5   |  29  |  0.64    Ca    9.5      22 Jan 2019 06:56  Phos  4.0     01-22  Mg     1.8     01-22        Phosphorus Level, Serum: 4.0 mg/dL (01-22 @ 06:56)  Magnesium, Serum: 1.8 mg/dL (01-22 @ 06:56)

## 2024-10-30 NOTE — H&P ADULT - NSHPPOADEEPVENOUSTHROMB_GEN_A_CORE
How Severe Is Your Rash?: moderate Is This A New Presentation, Or A Follow-Up?: Rash What Type Of Note Output Would You Prefer (Optional)?: Standard Output Additional History: H/O Herpes simplex.  Gets a flare monthly.  Was previously on Valtrex 1g qd no